# Patient Record
Sex: MALE | Race: WHITE | Employment: OTHER | URBAN - METROPOLITAN AREA
[De-identification: names, ages, dates, MRNs, and addresses within clinical notes are randomized per-mention and may not be internally consistent; named-entity substitution may affect disease eponyms.]

---

## 2017-01-23 ENCOUNTER — TRANSFERRED RECORDS (OUTPATIENT)
Dept: HEALTH INFORMATION MANAGEMENT | Facility: CLINIC | Age: 82
End: 2017-01-23

## 2017-03-28 ENCOUNTER — HOSPITAL ENCOUNTER (OUTPATIENT)
Dept: CARDIAC REHAB | Facility: CLINIC | Age: 82
End: 2017-03-28
Attending: SPECIALIST
Payer: COMMERCIAL

## 2017-03-28 VITALS — BODY MASS INDEX: 34.93 KG/M2 | WEIGHT: 244 LBS | HEIGHT: 70 IN

## 2017-03-28 PROCEDURE — G0424 PULMONARY REHAB W EXER: HCPCS | Performed by: REHABILITATION PRACTITIONER

## 2017-03-28 PROCEDURE — 40000244 ZZH STATISTIC VISIT PULM REHAB: Performed by: REHABILITATION PRACTITIONER

## 2017-03-28 ASSESSMENT — DUKE ACTIVITY SCORE INDEX (DASI)
DASI METS SCORE: 3.97
VO2_PEAK: 13.9

## 2017-03-28 ASSESSMENT — 6 MINUTE WALK TEST (6MWT)
FEMALE CALC: 314.22
MALE CALC: 430.11

## 2017-03-28 NOTE — PROGRESS NOTES
03/28/17 1400   Session   Session Initial Evaluation and Exercise Prescription   Certified through this date 04/26/17   Type Initial   General Information   Treatment Diagnosis COPD   Classification of COPD NA   Onset Date 01/01/89   [facility]   Current Signs and Symptoms SOB;Dizziness;Fatigue   Outpatient Pulmonary Rehab Start Date 03/28/17   Primary Physician Dr. Tejada   Pulmonolgist Dr. nicholson   Other Specialty Provider Etelvina Huggins   Medical History/Comorbidities   Medical History/Comorbidities COPD;Anxiety   Untoward Events/Exacerbations/Hospitalizations   Untoward Events/Exacerbations/Hospitalizations None   Sputum   Sputum Production Amount 0.5-1 tsp   Sputum Production Color White   Sputum Production Consistency Thick   Tobacco History   Tobacco Former smoker   Tobacco Habit Cigarettes   Years Smoked 40   Average Packs Per Day 1   Quit Date or Planned Quit Date 01/01/89   Interventions Planned None: Patient is in maintenance   Medications   Long-Acting Beta Agonist Prescribed, taking as prescribed   Short-Acting Beta Agonist Prescribed, taking as prescribed   Long-Acting Anticholinergic Not prescribed   Short-Acting Anticholinergic Not prescribed   Inhaled Corticosteroid Prescribed, taking as prescribed   Oral Corticosteroid Prescribed, taking as prescribed   Medications Reconciled By Patient;Medical record   Pain   Patient Currently in Pain Yes   Pain Location low back   Pain Rating 6/10   Pain Description Burning;Ache   Pain Description Comment Taking T3 every 4 hours for chronic pain   Falls Screen   Have you fallen two or more times in the past year? Yes   Have you fallen and had an injury in the past year? Yes   Referral initiated to physical therapy No   Comment Has attended PT in the past, falls may be related to low back pain and tremors   Living and Work Status   Living Arrangements condominium;spouse   Support System Live with an adult   Environmental  "Factors No concerns   ADL Limitations Cooking;Stair climbing   Initial Duke Activity Status Index (DASI) score. A measure of functional capacity. The goal is to have a pre-program raw score of 9.95 (~4 METs) or above 10   Initial DASI VO2 Peak (ml*kg-1*min-1) 13.9   Initial DASI MET Level 3.97   Return to Employment Retired   Physical Assessments   Incisions Not applicable   Edema +2 Mild   Left Lung Sounds wheezes   Right Lung Sounds wheezes   Pulmonary Function Test (PFTs)   PFT Results Information not available   Individualized Treatment Plan   Sessions Scheduled 10   Sessions Attended 1   Type Aerobic exercise;Resistance training   Oxygen Use   Supplemental Oxygen Needed No   Interventions Recommended NA   Exercise Prescription   Mode Sci-Fit;Arm Ergometer/UBE;Weights   Frequency 2 days/week   Duration/Time 15-30 min;Intermittent bouts   THR (85% of age predicted max heart rate)  117.3   Effort Rating (0-10) 4-7   Progression of Exercise Plan to increase intermiitent bouts with therapeutic rest break until patient is able to successfully complete 20-30 minutes of aerobic exercise, then plan to increase exercise workloads by up to 0.25 METs per week   Oxygen Titration with Exercise NA   Exercise Assessment   6 Minute Walk Predicted - Gender Selection (6 minute walk not completed due to orthopedic limitations)   6 Minute Walk Predicted (Female) 314.22   6 Minute Walk Predicted (Male) 430.11   Resting HR 77   Exercise HR 87   SpO2 92   Exercise SpO2 95   Current MET level 2.2   Exercise Tolerance poor   Normal Limits Discussed Yes   Current Symptoms at Home Dyspnea;Dizziness;Fatigue   Current Symptoms in Rehab Dyspnea;Fatigue   Limitations low back pain   Nutrition Management   Age 82   Height 1.778 m (5' 10\")   Weight 110.7 kg (244 lb)   BMI (Calculated) 35.08   Assessment Overweight   Goal weight 90.7 kg (200 lb)   Interventions Planned Attend group nutrition class;Educate on weight loss principles;Initiate plan " for weight loss/weight maintenance   Psychosocial   Initial Patient Health Questionnaire -9 (PHQ-9) for depression. To notify physician if pre-score >9. 6   Initial [facility] [place] Survey score. A quality of life measure. To re evaluate if total score is > 25. Also consider PHQ-9 and DASI to determine if patient should be referred back to physician. 28   Initial Shortness of Breath Questionnaire (SOBQ) score. The goal is to reduce the score pre to post program. 100   Stages of Change   Aerobic Exercise Contemplation   Physical Activity Contemplation   Recommended diet Contemplation   Stress Action   Smoking Cessation Maintenance   Oxygen Usage NA   Current Home Exercise   Type of Exercise None   Recommended Home Exercise Prescription   Type of Exercise Not recommended   30 Day Exercise Plan Will try to find non-weight bearing home exercise program for patient   Learning Assessment   Learner Patient   Primary Language English   Preferred Learning Style Listening;Reading;Pictures/Video;Demonstration   Barriers to Learning Cognitive  (memory)   Patient Education/Referrals   Education Recommended Nutrition;Panic and Anxiety;Anatomy and Disease Process;Medication Overview;Air Quality   Pulmonary Rehab Goals   Pulmonary Rehab Goals 1;2   Goal 1   Goal Patient will be able to use pursed lip breathing technique 60-80% of time during exercise   Target Date 04/28/17   Progress Towards Goal 3/28 Patient would like to ingrain PLB in his ADLs to decrease dyspnea overall; will work with patient to trigger use of PLB during pulmonary rehab sessions   Goal 2   Goal Patient will be able to perform ADLs, including sexual activity and visiting with friends with minimal dyspnea; estimated METs 2.5-3   Target Date 05/28/17   Progress Towards Goal 3/28 Patient will attend pulmonary rehab twice weekly for 10 sessions to build exercise tolerance and endurance.   Assessment   Assessment Uriah is a very pleasant 81 yo gentleman, known to  the pulmonary rehab staff after attending pulmonary rehab last year. At that time a request was made for additional sessions [facility] Approval of additional session has finally been recieved and patient is happily anticipating resuming pulmonary rehabilitation. Together we have been able to better define his personal goals.     The patient's history and clinical status including hemodynamics were evaluated.  The patient was assessed to be stable and appropriate to begin exercise. The patient's functional capacity and exercise prescription were assessed on the arm ergometer and Sci-fit stepper due to orthopedic limitations.  See results above. The patient was oriented to the program and equipment. Goals and objectives were discussed. Cardiopulmonary response was WNL. No symptoms, complaints or pain were reported. Good prognosis for reaching above goals. Skilled therapy is necessary in order to monitor cardiopulmonary response to exercise, to provide education and behavior change counseling needed to achieve patient's goals. All goals created in conjunction with patient.  Plan to progress to 25-35 minutes of aerobic exercise prior to discharge from pulmonary rehab, initiate muscle conditioning as appropriate and provide education and behavior change counseling.   Supervising MD: Dr. Pena   Time spent with patient: 65 minutes      I have reviewed and agree with this patient s individual treatment plan and exercise prescription for pulmonary rehabilitation.  Please see    individual treatment plan for details of progress and plan.

## 2017-03-30 ENCOUNTER — HOSPITAL ENCOUNTER (OUTPATIENT)
Dept: CARDIAC REHAB | Facility: CLINIC | Age: 82
End: 2017-03-30
Attending: SPECIALIST
Payer: COMMERCIAL

## 2017-03-30 VITALS — BODY MASS INDEX: 34.75 KG/M2 | WEIGHT: 242.2 LBS

## 2017-03-30 PROCEDURE — G0424 PULMONARY REHAB W EXER: HCPCS | Performed by: REHABILITATION PRACTITIONER

## 2017-03-30 PROCEDURE — 40000244 ZZH STATISTIC VISIT PULM REHAB: Performed by: REHABILITATION PRACTITIONER

## 2017-04-04 ENCOUNTER — HOSPITAL ENCOUNTER (OUTPATIENT)
Dept: CARDIAC REHAB | Facility: CLINIC | Age: 82
End: 2017-04-04
Attending: SPECIALIST
Payer: COMMERCIAL

## 2017-04-04 VITALS — WEIGHT: 245 LBS | BODY MASS INDEX: 35.15 KG/M2

## 2017-04-04 PROCEDURE — G0424 PULMONARY REHAB W EXER: HCPCS | Performed by: REHABILITATION PRACTITIONER

## 2017-04-04 PROCEDURE — 40000244 ZZH STATISTIC VISIT PULM REHAB: Performed by: REHABILITATION PRACTITIONER

## 2017-04-06 ENCOUNTER — HOSPITAL ENCOUNTER (OUTPATIENT)
Dept: CARDIAC REHAB | Facility: CLINIC | Age: 82
End: 2017-04-06
Attending: SPECIALIST
Payer: COMMERCIAL

## 2017-04-06 VITALS — BODY MASS INDEX: 35.15 KG/M2 | WEIGHT: 245 LBS

## 2017-04-06 PROCEDURE — G0424 PULMONARY REHAB W EXER: HCPCS | Performed by: REHABILITATION PRACTITIONER

## 2017-04-06 PROCEDURE — 40000244 ZZH STATISTIC VISIT PULM REHAB: Performed by: REHABILITATION PRACTITIONER

## 2017-04-13 ENCOUNTER — HOSPITAL ENCOUNTER (OUTPATIENT)
Dept: CARDIAC REHAB | Facility: CLINIC | Age: 82
End: 2017-04-13
Attending: SPECIALIST
Payer: COMMERCIAL

## 2017-04-13 VITALS — BODY MASS INDEX: 34.95 KG/M2 | WEIGHT: 243.6 LBS

## 2017-04-13 PROCEDURE — 40000244 ZZH STATISTIC VISIT PULM REHAB: Performed by: REHABILITATION PRACTITIONER

## 2017-04-13 PROCEDURE — G0424 PULMONARY REHAB W EXER: HCPCS | Performed by: REHABILITATION PRACTITIONER

## 2017-04-18 ENCOUNTER — HOSPITAL ENCOUNTER (OUTPATIENT)
Dept: CARDIAC REHAB | Facility: CLINIC | Age: 82
End: 2017-04-18
Attending: SPECIALIST
Payer: COMMERCIAL

## 2017-04-18 VITALS — WEIGHT: 243 LBS | BODY MASS INDEX: 34.87 KG/M2

## 2017-04-18 PROCEDURE — G0424 PULMONARY REHAB W EXER: HCPCS

## 2017-04-18 PROCEDURE — 40000244 ZZH STATISTIC VISIT PULM REHAB

## 2017-04-25 ENCOUNTER — HOSPITAL ENCOUNTER (OUTPATIENT)
Dept: CARDIAC REHAB | Facility: CLINIC | Age: 82
End: 2017-04-25
Attending: SPECIALIST
Payer: COMMERCIAL

## 2017-04-25 VITALS — WEIGHT: 243 LBS | HEIGHT: 70 IN | BODY MASS INDEX: 34.79 KG/M2

## 2017-04-25 PROCEDURE — 40000244 ZZH STATISTIC VISIT PULM REHAB: Performed by: REHABILITATION PRACTITIONER

## 2017-04-25 PROCEDURE — G0424 PULMONARY REHAB W EXER: HCPCS | Performed by: REHABILITATION PRACTITIONER

## 2017-04-25 ASSESSMENT — 6 MINUTE WALK TEST (6MWT)
MALE CALC: 430.91
FEMALE CALC: 315.26

## 2017-04-25 ASSESSMENT — DUKE ACTIVITY SCORE INDEX (DASI)
DASI METS SCORE: 3.97
VO2_PEAK: 13.9

## 2017-04-25 NOTE — PROGRESS NOTES
04/25/17 1400   Session   Session 30 Day Individualized Treatment Plan   Certified through this date 05/24/17   Type Reassessment   General Information   Treatment Diagnosis COPD   Classification of COPD NA   Onset Date 01/01/89   [facility]   Outpatient Pulmonary Rehab Start Date 03/28/17   Primary Physician Dr. Tejada   Pulmonolgist Dr. nicholson   Other Specialty Provider Etelvina Huggins   Medical History/Comorbidities   Medical History/Comorbidities COPD;Anxiety   Untoward Events/Exacerbations/Hospitalizations   Untoward Events/Exacerbations/Hospitalizations None   Sputum   Sputum Production Amount 0.5-1 tsp   Sputum Production Color White   Sputum Production Consistency Thick   Tobacco History   Tobacco Former smoker   Tobacco Habit Cigarettes   Years Smoked 40   Average Packs Per Day 1   Quit Date or Planned Quit Date 01/01/89   Interventions Planned None: Patient is in maintenance   Medications   Long-Acting Beta Agonist Prescribed, taking as prescribed   Short-Acting Beta Agonist Prescribed, taking as prescribed   Long-Acting Anticholinergic Not prescribed   Short-Acting Anticholinergic Not prescribed   Inhaled Corticosteroid Prescribed, taking as prescribed   Oral Corticosteroid Prescribed, taking as prescribed   Medications Reconciled By Patient;Medical record   Pain   Patient Currently in Pain Yes   Pain Location right shoulder   Pain Rating 5/10   Falls Screen   Have you fallen two or more times in the past year? Yes   Have you fallen and had an injury in the past year? Yes   Referral initiated to physical therapy No   Comment Has attended PT in the past, falls may be related to low back pain and tremors   Living and Work Status   Living Arrangements condominium;spouse   Support System Live with an adult   Environmental Factors No concerns   ADL Limitations Cooking;Stair climbing   Initial Duke Activity Status Index (DASI) score. A measure of functional capacity. The  "goal is to have a pre-program raw score of 9.95 (~4 METs) or above 10   Initial DASI VO2 Peak (ml*kg-1*min-1) 13.9   Initial DASI MET Level 3.97   Return to Employment Retired   Physical Assessments   Incisions Not applicable   Edema Not assessed   Left Lung Sounds not assessed   Right Lung Sounds not assessed   Pulmonary Function Test (PFTs)   PFT Results Information not available   Individualized Treatment Plan   Sessions Scheduled 10   Sessions Attended 7   Oxygen Use   Supplemental Oxygen Needed No   Interventions Recommended NA   Exercise Prescription   Mode Sci-Fit;Arm Ergometer/UBE;Weights   Frequency 2 days/week   Duration/Time 15-30 min;Intermittent bouts   THR (85% of age predicted max heart rate)  117.3   Effort Rating (0-10) 4-7   Progression of Exercise Plan to increase intermiitent bouts with therapeutic rest break until patient is able to successfully complete 20-30 minutes of aerobic exercise, then plan to increase exercise workloads by up to 0.25 METs per week   Oxygen Titration with Exercise NA   Exercise Assessment   6 Minute Walk Predicted - Gender Selection (6 minute walk not completed due to orthopedic limitations)   6 Minute Walk Predicted (Female) 315.26   6 Minute Walk Predicted (Male) 430.91   Resting HR 84   Exercise HR 93   SpO2 96   Exercise SpO2 93   Current MET level 2.3   Exercise Tolerance poor   Normal Limits Discussed Yes   Current Symptoms at Home Dyspnea;Dizziness;Fatigue   Current Symptoms in Rehab Dyspnea;Fatigue   Limitations low back pain   Nutrition Management   Age 82   Height 1.778 m (5' 10\")   Weight 110.2 kg (243 lb)   BMI (Calculated) 34.94   Assessment Overweight   Goal weight 90.7 kg (200 lb)   Interventions Planned Attend group nutrition class;Educate on weight loss principles;Initiate plan for weight loss/weight maintenance   Psychosocial   Initial Patient Health Questionnaire -9 (PHQ-9) for depression. To notify physician if pre-score >9. 6   Initial [facility] " Survey score. A quality of life measure. To re evaluate if total score is > 25. Also consider PHQ-9 and DASI to determine if patient should be referred back to physician. 28   Initial Shortness of Breath Questionnaire (SOBQ) score. The goal is to reduce the score pre to post program. 100   Stages of Change   Aerobic Exercise Contemplation   Physical Activity Contemplation   Recommended diet Contemplation   Stress Action   Smoking Cessation Maintenance   Oxygen Usage NA   Current Home Exercise   Type of Exercise None   Recommended Home Exercise Prescription   Type of Exercise Not recommended   30 Day Exercise Plan Will try to find non-weight bearing home exercise program for patient   Learning Assessment   Learner Patient   Primary Language English   Preferred Learning Style Listening;Reading;Pictures/Video;Demonstration   Barriers to Learning Cognitive  (memory)   Patient Education/Referrals   Education Recommended Nutrition;Panic and Anxiety;Anatomy and Disease Process;Medication Overview;Air Quality   Pulmonary Rehab Goals   Pulmonary Rehab Goals 1;2   Goal 1   Goal Patient will be able to use pursed lip breathing technique 60-80% of time during exercise   Target Date 04/28/17   Progress Towards Goal 3/28 Patient would like to ingrain PLB in his ADLs to decrease dyspnea overall; will work with patient to trigger use of PLB during pulmonary rehab sessions 4/25 Patient has not been using PLB consistantly with exercise, states he practices at home when he remembers   Goal 2   Goal Patient will be able to perform ADLs, including sexual activity and visiting with friends with minimal dyspnea; estimated METs 2.5-3   Target Date 05/28/17   Progress Towards Goal 3/28 Patient will attend pulmonary rehab twice weekly for 10 sessions to build exercise tolerance and endurance.4/25 Patient is able to visit with coffee club to socialize, would like to visit golf club but [place] is on the second level without elevator which  makes acess near impossible. Will try to increase leg strength for stair climbing and teach stair climbing technique with dyspnea   Assessment   Assessment Kruse (Bill) Has made slow progress in pulmonary rehabilitation at this time. he has been able to increase from 2, 5 minutes exercise bouts at a peak of 2.2 METs to 2 10-15 minute bouts at a peak of 2.3 METs. His othopedic pains and discomforts continue to limit exercise progression. As of the last two weeks, however, patient has been able to make a mice increase in overall exercise time. He was initially given 10 sessions of pulmonary rehabilitation, he would benefit from additional sessions however to continue to increase exercise workloads and use of breathing techniques to reach goals as stated just above. He will be seeing his primary physciain this week and request an additional 10 sessions of pulmonary rehabilitation at that time.     I have reviewed initial evaluation outcomes, and agree with exercise prescription for respiratory therapy for this patient

## 2017-04-27 ENCOUNTER — HOSPITAL ENCOUNTER (OUTPATIENT)
Dept: CARDIAC REHAB | Facility: CLINIC | Age: 82
End: 2017-04-27
Attending: SPECIALIST
Payer: COMMERCIAL

## 2017-04-27 VITALS — WEIGHT: 243.2 LBS | BODY MASS INDEX: 34.9 KG/M2

## 2017-04-27 PROCEDURE — 40000244 ZZH STATISTIC VISIT PULM REHAB: Performed by: REHABILITATION PRACTITIONER

## 2017-04-27 PROCEDURE — G0424 PULMONARY REHAB W EXER: HCPCS | Performed by: REHABILITATION PRACTITIONER

## 2017-05-01 ENCOUNTER — TRANSFERRED RECORDS (OUTPATIENT)
Dept: HEALTH INFORMATION MANAGEMENT | Facility: CLINIC | Age: 82
End: 2017-05-01

## 2017-05-02 ENCOUNTER — HOSPITAL ENCOUNTER (OUTPATIENT)
Dept: CARDIAC REHAB | Facility: CLINIC | Age: 82
End: 2017-05-02
Attending: SPECIALIST
Payer: MEDICARE

## 2017-05-02 VITALS — WEIGHT: 244.2 LBS | BODY MASS INDEX: 35.04 KG/M2

## 2017-05-02 PROCEDURE — 40000244 ZZH STATISTIC VISIT PULM REHAB: Performed by: REHABILITATION PRACTITIONER

## 2017-05-02 PROCEDURE — G0424 PULMONARY REHAB W EXER: HCPCS | Performed by: REHABILITATION PRACTITIONER

## 2017-05-09 ENCOUNTER — HOSPITAL ENCOUNTER (OUTPATIENT)
Dept: CARDIAC REHAB | Facility: CLINIC | Age: 82
End: 2017-05-09
Attending: SPECIALIST
Payer: MEDICARE

## 2017-05-09 VITALS — WEIGHT: 244 LBS | BODY MASS INDEX: 35.01 KG/M2

## 2017-05-09 PROCEDURE — G0424 PULMONARY REHAB W EXER: HCPCS | Performed by: REHABILITATION PRACTITIONER

## 2017-05-09 PROCEDURE — 40000244 ZZH STATISTIC VISIT PULM REHAB: Performed by: REHABILITATION PRACTITIONER

## 2017-06-13 ENCOUNTER — HOSPITAL ENCOUNTER (OUTPATIENT)
Dept: CARDIAC REHAB | Facility: CLINIC | Age: 82
End: 2017-06-13
Attending: SPECIALIST
Payer: COMMERCIAL

## 2017-06-13 VITALS — BODY MASS INDEX: 34.22 KG/M2 | HEIGHT: 70 IN | WEIGHT: 239 LBS

## 2017-06-13 PROCEDURE — 40000244 ZZH STATISTIC VISIT PULM REHAB: Performed by: REHABILITATION PRACTITIONER

## 2017-06-13 PROCEDURE — G0424 PULMONARY REHAB W EXER: HCPCS | Performed by: REHABILITATION PRACTITIONER

## 2017-06-13 ASSESSMENT — 6 MINUTE WALK TEST (6MWT)
FEMALE CALC: 319.42
MALE CALC: 434.11

## 2017-06-13 ASSESSMENT — DUKE ACTIVITY SCORE INDEX (DASI)
VO2_PEAK: 13.9
DASI METS SCORE: 3.97

## 2017-06-13 NOTE — PROGRESS NOTES
06/13/17 1300   Session   Session 60 Day Individualized Treatment Plan   Certified through this date 07/12/17   Type Reassessment   General Information   Treatment Diagnosis COPD   Classification of COPD NA   Onset Date 01/01/89   [facility]   Outpatient Pulmonary Rehab Start Date 03/28/17   Primary Physician Dr. Tejada   Pulmonolgist Dr. nicholson   Other Specialty Provider Etelvina Huggins   Medical History/Comorbidities   Medical History/Comorbidities COPD;Anxiety   Untoward Events/Exacerbations/Hospitalizations   Untoward Events/Exacerbations/Hospitalizations None   Sputum   Sputum Production Amount 0.5-1 tsp   Sputum Production Color White   Sputum Production Consistency Thick   Tobacco History   Tobacco Former smoker   Tobacco Habit Cigarettes   Years Smoked 40   Average Packs Per Day 1   Quit Date or Planned Quit Date 01/01/89   Interventions Planned None: Patient is in maintenance   Medications   Long-Acting Beta Agonist Prescribed, taking as prescribed   Short-Acting Beta Agonist Prescribed, taking as prescribed   Long-Acting Anticholinergic Not prescribed   Short-Acting Anticholinergic Not prescribed   Inhaled Corticosteroid Prescribed, taking as prescribed   Oral Corticosteroid Prescribed, taking as prescribed   Medications Reconciled By Patient;Medical record   Pain   Patient Currently in Pain Yes   Pain Location right shoulder   Pain Rating 6/10   Pain Description Burning;Ache   Pain Description Comment Taking T3 every 4 hours for chronic pain   Pain Comments Patient is considering surgery for rotator cuff repair   Falls Screen   Have you fallen two or more times in the past year? Yes   Have you fallen and had an injury in the past year? Yes   Referral initiated to physical therapy No   Comment Has attended PT in the past, falls may be related to low back pain and tremors   Living and Work Status   Living Arrangements condominium;spouse   Support System Live with an  "adult   Environmental Factors No concerns   ADL Limitations Cooking;Stair climbing   Initial Duke Activity Status Index (DASI) score. A measure of functional capacity. The goal is to have a pre-program raw score of 9.95 (~4 METs) or above 10   Initial DASI VO2 Peak (ml*kg-1*min-1) 13.9   Initial DASI MET Level 3.97   Return to Employment Retired   Physical Assessments   Incisions Not applicable   Edema Not assessed   Left Lung Sounds not assessed   Right Lung Sounds not assessed   Pulmonary Function Test (PFTs)   PFT Results Information not available   Individualized Treatment Plan   Sessions Scheduled 22   Sessions Attended 11   Type Aerobic exercise;Resistance training   Oxygen Use   Supplemental Oxygen Needed No   Interventions Recommended NA   Exercise Prescription   Mode Sci-Fit;Arm Ergometer/UBE;Weights   Frequency 2 days/week   Duration/Time 15-30 min;Intermittent bouts   THR (85% of age predicted max heart rate)  117.3   Effort Rating (0-10) 4-7   Progression of Exercise Plan to increase intermiitent bouts with therapeutic rest break until patient is able to successfully complete 20-30 minutes of aerobic exercise, then plan to increase exercise workloads by up to 0.25 METs per week   Oxygen Titration with Exercise NA   Exercise Assessment   6 Minute Walk Predicted - Gender Selection (6 minute walk not completed due to orthopedic limitations)   6 Minute Walk Predicted (Female) 319.42   6 Minute Walk Predicted (Male) 434.11   Resting HR 87   Exercise HR 92   SpO2 95   Exercise SpO2 94   Current MET level 2.4   Exercise Tolerance poor   Normal Limits Discussed Yes   Current Symptoms at Home Dyspnea;Dizziness;Fatigue   Current Symptoms in Rehab Dyspnea;Fatigue   Limitations low back pain   Nutrition Management   Age 82   Height 1.778 m (5' 10\")   Weight 108.4 kg (239 lb)   BMI (Calculated) 34.36   Assessment Overweight   Goal weight 90.7 kg (200 lb)   Interventions Planned Attend group nutrition class;Educate on " weight loss principles;Initiate plan for weight loss/weight maintenance   Psychosocial   Initial Patient Health Questionnaire -9 (PHQ-9) for depression. To notify physician if pre-score >9. 6   Initial [facility] [place] Survey score. A quality of life measure. To re evaluate if total score is > 25. Also consider PHQ-9 and DASI to determine if patient should be referred back to physician. 28   Initial Shortness of Breath Questionnaire (SOBQ) score. The goal is to reduce the score pre to post program. 100   Stages of Change   Aerobic Exercise Contemplation   Physical Activity Contemplation   Recommended diet Contemplation   Stress Action   Smoking Cessation Maintenance   Oxygen Usage NA   Current Home Exercise   Type of Exercise None   Recommended Home Exercise Prescription   Type of Exercise Not recommended   30 Day Exercise Plan Will try to find non-weight bearing home exercise program for patient   Learning Assessment   Learner Patient   Primary Language English   Preferred Learning Style Listening;Reading;Pictures/Video;Demonstration   Barriers to Learning Cognitive  (memory)   Patient Education/Referrals   Education Recommended Nutrition;Panic and Anxiety;Anatomy and Disease Process;Medication Overview;Air Quality   Pulmonary Rehab Goals   Pulmonary Rehab Goals 1;2   Goal 1   Goal Patient will be able to use pursed lip breathing technique 60-80% of time during exercise   Target Date 04/28/17   Progress Towards Goal 3/28 Patient would like to ingrain PLB in his ADLs to decrease dyspnea overall; will work with patient to trigger use of PLB during pulmonary rehab sessions 4/25 Patient has not been using PLB consistantly with exercise, states he practices at home when he remembers 6/13 Patient still requiring varbal cues to use PLB during exercise   Goal 2   Goal Patient will be able to perform ADLs, including sexual activity and visiting with friends with minimal dyspnea; estimated METs 2.5-3   Target Date 05/28/17    Progress Towards Goal 3/28 Patient will attend pulmonary rehab twice weekly for 10 sessions to build exercise tolerance and endurance.4/25 Patient is able to visit with coffee club to socialize, would like to visit golf club but [employer] is on the second level without elevator which makes acess near impossible. Will try to increase leg strength for stair climbing and teach stair climbing technique with dyspnea 6/13 Increasing activity tolerance very slowly due to break in rehab sessions noted below   Assessment   Assessment Kruse (Bill) has made only slight progress in his exercise capacity since his last ITP update. His largest barrier to increases in his exercise tolerance/endurance and gork towards his goals was the large break he took from rehab sessions because we were awating approval for additional sessions through [facility]. 40 days passed between initial request and today. Pricila returns today and is noticably more unstable with ambulation from scooter to exercise equipment but tolerated exercise session well with only a slight decrease in his exercise time on arm ergometer, mostly due to rotator cuff discomfort. Patient used mainly left arm on arm ergometer today due to this. He reports today he continues to have the goal of being less dyspnic with activity. Will continue to work closely with patient to incorporate breathing techniques during activity as well as increasing exercise tolerance and endurance to ease work while at home.      I have reviewed and agree with this patient s individual treatment plan and exercise prescription for respiratory therapy.  Please see    individual treatment plan for details of progress and plan.

## 2017-06-15 ENCOUNTER — HOSPITAL ENCOUNTER (OUTPATIENT)
Dept: CARDIAC REHAB | Facility: CLINIC | Age: 82
End: 2017-06-15
Attending: SPECIALIST
Payer: COMMERCIAL

## 2017-06-15 VITALS — BODY MASS INDEX: 34.29 KG/M2 | WEIGHT: 239 LBS

## 2017-06-15 PROCEDURE — 40000244 ZZH STATISTIC VISIT PULM REHAB: Performed by: REHABILITATION PRACTITIONER

## 2017-06-15 PROCEDURE — G0424 PULMONARY REHAB W EXER: HCPCS | Performed by: REHABILITATION PRACTITIONER

## 2017-06-20 ENCOUNTER — HOSPITAL ENCOUNTER (OUTPATIENT)
Dept: CARDIAC REHAB | Facility: CLINIC | Age: 82
End: 2017-06-20
Attending: SPECIALIST
Payer: COMMERCIAL

## 2017-06-20 VITALS — BODY MASS INDEX: 34.15 KG/M2 | WEIGHT: 238 LBS

## 2017-06-20 PROCEDURE — 40000244 ZZH STATISTIC VISIT PULM REHAB: Performed by: REHABILITATION PRACTITIONER

## 2017-06-20 PROCEDURE — G0424 PULMONARY REHAB W EXER: HCPCS | Performed by: REHABILITATION PRACTITIONER

## 2017-06-22 ENCOUNTER — HOSPITAL ENCOUNTER (OUTPATIENT)
Dept: CARDIAC REHAB | Facility: CLINIC | Age: 82
End: 2017-06-22
Attending: SPECIALIST
Payer: COMMERCIAL

## 2017-06-22 VITALS — WEIGHT: 240 LBS | BODY MASS INDEX: 34.44 KG/M2

## 2017-06-22 PROCEDURE — G0424 PULMONARY REHAB W EXER: HCPCS | Performed by: REHABILITATION PRACTITIONER

## 2017-06-22 PROCEDURE — 40000244 ZZH STATISTIC VISIT PULM REHAB: Performed by: REHABILITATION PRACTITIONER

## 2017-07-06 ENCOUNTER — HOSPITAL ENCOUNTER (OUTPATIENT)
Dept: CARDIAC REHAB | Facility: CLINIC | Age: 82
End: 2017-07-06
Attending: SPECIALIST
Payer: COMMERCIAL

## 2017-07-06 VITALS — BODY MASS INDEX: 33.89 KG/M2 | WEIGHT: 236.2 LBS

## 2017-07-06 PROCEDURE — 40000244 ZZH STATISTIC VISIT PULM REHAB: Performed by: REHABILITATION PRACTITIONER

## 2017-07-06 PROCEDURE — G0424 PULMONARY REHAB W EXER: HCPCS | Performed by: REHABILITATION PRACTITIONER

## 2017-07-11 ENCOUNTER — HOSPITAL ENCOUNTER (OUTPATIENT)
Dept: CARDIAC REHAB | Facility: CLINIC | Age: 82
End: 2017-07-11
Attending: SPECIALIST
Payer: COMMERCIAL

## 2017-07-11 VITALS — BODY MASS INDEX: 33.96 KG/M2 | WEIGHT: 237.2 LBS | HEIGHT: 70 IN

## 2017-07-11 PROCEDURE — 40000244 ZZH STATISTIC VISIT PULM REHAB: Performed by: REHABILITATION PRACTITIONER

## 2017-07-11 PROCEDURE — G0424 PULMONARY REHAB W EXER: HCPCS | Performed by: REHABILITATION PRACTITIONER

## 2017-07-11 ASSESSMENT — 6 MINUTE WALK TEST (6MWT)
MALE CALC: 435.55
FEMALE CALC: 321.29

## 2017-07-11 ASSESSMENT — DUKE ACTIVITY SCORE INDEX (DASI)
VO2_PEAK: 13.9
DASI METS SCORE: 3.97

## 2017-07-11 NOTE — PROGRESS NOTES
07/11/17 1500   Session   Session 90 Day Individualized Treatment Plan   Certified through this date 08/09/17   Type Reassessment   General Information   Treatment Diagnosis COPD   Classification of COPD NA   Onset Date 01/01/89   [facility]  [facility]   Outpatient Pulmonary Rehab Start Date 03/28/17   Primary Physician Dr. Tejada   Pulmonolgist Dr. nicholson   Other Specialty Provider Etelvina Huggins   Medical History/Comorbidities   Medical History/Comorbidities COPD;Anxiety   Untoward Events/Exacerbations/Hospitalizations   Untoward Events/Exacerbations/Hospitalizations None   Sputum   Sputum Production Amount 0.5-1 tsp   Sputum Production Color White   Sputum Production Consistency Thick   Tobacco History   Tobacco Former smoker   Tobacco Habit Cigarettes   Years Smoked 40   Average Packs Per Day 1   Quit Date or Planned Quit Date 01/01/89   Interventions Planned None: Patient is in maintenance   Medications   Long-Acting Beta Agonist Prescribed, taking as prescribed   Short-Acting Beta Agonist Prescribed, taking as prescribed   Long-Acting Anticholinergic Not prescribed   Short-Acting Anticholinergic Not prescribed   Inhaled Corticosteroid Prescribed, taking as prescribed   Oral Corticosteroid Prescribed, taking as prescribed   Medications Reconciled By Patient;Medical record   Pain   Patient Currently in Pain Yes   Pain Location right hip   Pain Rating 9-10   Pain Description Comment Taking T3 every 4 hours for chronic pain   Falls Screen   Have you fallen two or more times in the past year? Yes   Have you fallen and had an injury in the past year? Yes   Referral initiated to physical therapy No   Comment Has attended PT in the past, falls may be related to low back pain and tremors   Living and Work Status   Living Arrangements condominium;spouse   Support System Live with an adult   Environmental Factors No concerns   ADL Limitations Cooking;Stair climbing   Initial [facility] Activity  "Status Index (DASI) score. A measure of functional capacity. The goal is to have a pre-program raw score of 9.95 (~4 METs) or above 10   Initial DASI VO2 Peak (ml*kg-1*min-1) 13.9   Initial DASI MET Level 3.97   Return to Employment Retired   Physical Assessments   Incisions Not applicable   Edema Not assessed   Left Lung Sounds not assessed   Right Lung Sounds not assessed   Pulmonary Function Test (PFTs)   PFT Results Information not available   Individualized Treatment Plan   Sessions Scheduled 22   Sessions Attended 16   Oxygen Use   Supplemental Oxygen Needed No   Interventions Recommended NA   Exercise Prescription   Mode Sci-Fit;Arm Ergometer/UBE;Weights   Frequency 2 days/week   Duration/Time 15-30 min;Intermittent bouts   THR (85% of age predicted max heart rate)  117.3   Effort Rating (0-10) 4-7   Progression of Exercise Plan to increase intermiitent bouts with therapeutic rest break until patient is able to successfully complete 20-30 minutes of aerobic exercise, then plan to increase exercise workloads by up to 0.25 METs per week   Oxygen Titration with Exercise NA   Exercise Assessment   6 Minute Walk Predicted - Gender Selection (6 minute walk not completed due to orthopedic limitations)   6 Minute Walk Predicted (Female) 321.29   6 Minute Walk Predicted (Male) 435.55   Resting HR 97   Exercise    SpO2 93   Exercise SpO2 94   Current MET level 2.4   Exercise Tolerance poor   Normal Limits Discussed Yes   Current Symptoms at Home Dyspnea;Dizziness;Fatigue   Current Symptoms in Rehab Dyspnea;Fatigue   Limitations low back pain   Nutrition Management   Age 82   Height 1.778 m (5' 10\")   Weight 107.6 kg (237 lb 3.2 oz)   BMI (Calculated) 34.11   Assessment Overweight   Goal weight 90.7 kg (200 lb)   Interventions Planned Attend group nutrition class;Educate on weight loss principles;Initiate plan for weight loss/weight maintenance   Psychosocial   Initial Patient Health Questionnaire -9 (PHQ-9) for " depression. To notify physician if pre-score >9. 6   Initial [facility] COOP Survey score. A quality of life measure. To re evaluate if total score is > 25. Also consider PHQ-9 and DASI to determine if patient should be referred back to physician. 28   Initial Shortness of Breath Questionnaire (SOBQ) score. The goal is to reduce the score pre to post program. 100   Stages of Change   Aerobic Exercise Contemplation   Physical Activity Contemplation   Recommended diet Contemplation   Stress Action   Smoking Cessation Maintenance   Oxygen Usage NA   Current Home Exercise   Type of Exercise None   Recommended Home Exercise Prescription   Type of Exercise Not recommended   30 Day Exercise Plan Will try to find non-weight bearing home exercise program for patient   Learning Assessment   Learner Patient   Primary Language English   Preferred Learning Style Listening;Reading;Pictures/Video;Demonstration   Barriers to Learning Cognitive  (memory)   Patient Education/Referrals   Education Recommended Nutrition;Panic and Anxiety;Anatomy and Disease Process;Medication Overview;Air Quality   Pulmonary Rehab Goals   Pulmonary Rehab Goals 1;2   Goal 1   Goal Patient will be able to use pursed lip breathing technique 60-80% of time during exercise   Target Date 04/28/17   Date Met 07/11/17   Progress Towards Goal 7/11 Patient is consistantly using pursed lip breathing with exercise   Goal 2   Goal Patient will be able to perform ADLs, including sexual activity and visiting with friends with minimal dyspnea; estimated METs 2.5-3   Target Date 05/28/17   Progress Towards Goal 3/28 Patient will attend pulmonary rehab twice weekly for 10 sessions to build exercise tolerance and endurance.4/25 Patient is able to visit with coffee club to socialize, would like to visit golf club but clubhouse is on the second level without elevator which makes acess near impossible. Will try to increase leg strength for stair climbing and teach stair  climbing technique with dyspnea 6/13 Increasing activity tolerance very slowly due to break in rehab sessions noted below 7/11 Endurance has been building nicely over the past month, short break in rehab sessions due to holiday and patient traveling out of town.   Assessment   Assessment Uriah Altamirano' has been able to increase his exercise endurance by 8 minutes over the past month. He has 6 session remaining and we anticipate discharge within the next month, we will discuss transition to the [facility] program for continued exercise training as well as working towards any goals not achieved prior to discharge. Patient will continue to benefit from skilled services to increase exercise workloads progressively towards goal as listed above.      I have reviewed and agree with this patient s individual treatment plan and exercise prescription for pulmonary rehabilitation.  Please see    individual treatment plan for details of progress and plan.

## 2017-07-13 ENCOUNTER — HOSPITAL ENCOUNTER (OUTPATIENT)
Dept: CARDIAC REHAB | Facility: CLINIC | Age: 82
End: 2017-07-13
Attending: SPECIALIST
Payer: COMMERCIAL

## 2017-07-13 VITALS — BODY MASS INDEX: 34.32 KG/M2 | WEIGHT: 239.2 LBS

## 2017-07-13 PROCEDURE — G0424 PULMONARY REHAB W EXER: HCPCS | Performed by: REHABILITATION PRACTITIONER

## 2017-07-13 PROCEDURE — 40000244 ZZH STATISTIC VISIT PULM REHAB: Performed by: REHABILITATION PRACTITIONER

## 2017-07-18 ENCOUNTER — HOSPITAL ENCOUNTER (OUTPATIENT)
Dept: CARDIAC REHAB | Facility: CLINIC | Age: 82
End: 2017-07-18
Attending: SPECIALIST
Payer: COMMERCIAL

## 2017-07-18 VITALS — BODY MASS INDEX: 34.32 KG/M2 | WEIGHT: 239.2 LBS

## 2017-07-18 PROCEDURE — G0424 PULMONARY REHAB W EXER: HCPCS

## 2017-07-18 PROCEDURE — 40000244 ZZH STATISTIC VISIT PULM REHAB

## 2017-07-20 ENCOUNTER — HOSPITAL ENCOUNTER (OUTPATIENT)
Dept: CARDIAC REHAB | Facility: CLINIC | Age: 82
End: 2017-07-20
Attending: SPECIALIST
Payer: COMMERCIAL

## 2017-07-20 VITALS — BODY MASS INDEX: 34.01 KG/M2 | WEIGHT: 237 LBS

## 2017-07-20 PROCEDURE — G0424 PULMONARY REHAB W EXER: HCPCS

## 2017-07-20 PROCEDURE — 40000244 ZZH STATISTIC VISIT PULM REHAB

## 2017-07-25 ENCOUNTER — HOSPITAL ENCOUNTER (OUTPATIENT)
Dept: CARDIAC REHAB | Facility: CLINIC | Age: 82
End: 2017-07-25
Attending: SPECIALIST
Payer: COMMERCIAL

## 2017-07-25 VITALS — BODY MASS INDEX: 33.72 KG/M2 | WEIGHT: 235 LBS

## 2017-07-25 PROCEDURE — 40000244 ZZH STATISTIC VISIT PULM REHAB

## 2017-07-25 PROCEDURE — G0424 PULMONARY REHAB W EXER: HCPCS

## 2017-07-27 ENCOUNTER — HOSPITAL ENCOUNTER (OUTPATIENT)
Dept: CARDIAC REHAB | Facility: CLINIC | Age: 82
End: 2017-07-27
Attending: SPECIALIST
Payer: COMMERCIAL

## 2017-07-27 VITALS — WEIGHT: 243 LBS | BODY MASS INDEX: 34.87 KG/M2

## 2017-07-27 PROCEDURE — 40000244 ZZH STATISTIC VISIT PULM REHAB: Performed by: REHABILITATION PRACTITIONER

## 2017-07-27 PROCEDURE — G0424 PULMONARY REHAB W EXER: HCPCS | Performed by: REHABILITATION PRACTITIONER

## 2017-08-01 ENCOUNTER — HOSPITAL ENCOUNTER (OUTPATIENT)
Dept: CARDIAC REHAB | Facility: CLINIC | Age: 82
End: 2017-08-01
Attending: SPECIALIST
Payer: COMMERCIAL

## 2017-08-01 VITALS — WEIGHT: 241 LBS | BODY MASS INDEX: 34.5 KG/M2 | HEIGHT: 70 IN

## 2017-08-01 PROCEDURE — 40000576 ZZH STATISTIC CARDIO PULMONARY REHAB VISIT #2

## 2017-08-01 PROCEDURE — G0424 PULMONARY REHAB W EXER: HCPCS

## 2017-08-01 ASSESSMENT — 6 MINUTE WALK TEST (6MWT)
MALE CALC: 427.49
FEMALE CALC: 311.56

## 2017-08-01 ASSESSMENT — DUKE ACTIVITY SCORE INDEX (DASI)
VO2_PEAK: 13.9
DASI METS SCORE: 3.97

## 2017-08-01 NOTE — PROGRESS NOTES
Uriahcali Calloway  83 year old   08/01/17 1100   Session   Session Discharge Note   Type Reassessment   General Information   Treatment Diagnosis COPD   Classification of COPD NA   Onset Date 01/01/89   [facility] [place]  [place]   Outpatient Pulmonary Rehab Start Date 03/28/17   Primary Physician Dr. Tejada   Pulmonolgist Dr. nicholson   Other Specialty Provider Etelvina Huggins   Medical History/Comorbidities   Medical History/Comorbidities COPD;Anxiety   Untoward Events/Exacerbations/Hospitalizations   Untoward Events/Exacerbations/Hospitalizations None   Sputum   Sputum Production Amount 0.5-1 tsp   Sputum Production Color White   Sputum Production Consistency Thick   Tobacco History   Tobacco Former smoker   Tobacco Habit Cigarettes   Years Smoked 40   Average Packs Per Day 1   Quit Date or Planned Quit Date 01/01/89   Interventions Planned None: Patient is in maintenance   Medications   Long-Acting Beta Agonist Prescribed, taking as prescribed   Short-Acting Beta Agonist Prescribed, taking as prescribed   Long-Acting Anticholinergic Not prescribed   Short-Acting Anticholinergic Not prescribed   Inhaled Corticosteroid Prescribed, taking as prescribed   Oral Corticosteroid Prescribed, taking as prescribed   Medications Reconciled By Patient;Medical record   Pain   Patient Currently in Pain Yes   Pain Location right hip   Pain Rating 8   Pain Description Comment Taking T3 every 4 hours for chronic pain   Falls Screen   Have you fallen two or more times in the past year? Yes   Have you fallen and had an injury in the past year? Yes   Referral initiated to physical therapy No   Comment Has attended PT in the past, falls may be related to low back pain and tremors   Living and Work Status   Living Arrangements condominium;spouse   Support System Live with an adult   Environmental Factors No concerns   ADL Limitations Cooking;Stair climbing   Initial [facility] Activity Status Index (DASI) score. A measure  "of functional capacity. The goal is to have a pre-program raw score of 9.95 (~4 METs) or above 10   Initial DASI VO2 Peak (ml*kg-1*min-1) 13.9   Initial DASI MET Level 3.97   Return to Employment Retired   Physical Assessments   Incisions Not applicable   Edema Not assessed   Left Lung Sounds not assessed   Right Lung Sounds not assessed   Pulmonary Function Test (PFTs)   PFT Results Information not available   Individualized Treatment Plan   Sessions Scheduled 22   Sessions Attended 22   Type Aerobic exercise;Flexibility training   Oxygen Use   Supplemental Oxygen Needed No   Interventions Recommended NA   Exercise Prescription   Mode Sci-Fit;Arm Ergometer/UBE;Weights   Frequency 2 days/week   Duration/Time 15-30 min;Intermittent bouts   THR (85% of age predicted max heart rate)  116.45   Effort Rating (0-10) 4-7   Progression of Exercise Plan to increase intermiitent bouts with therapeutic rest break until patient is able to successfully complete 20-30 minutes of aerobic exercise, then plan to increase exercise workloads by up to 0.25 METs per week   Oxygen Titration with Exercise NA   Exercise Assessment   6 Minute Walk Predicted - Gender Selection (6 minute walk not completed due to orthopedic limitations)   6 Minute Walk Predicted (Female) 311.56   6 Minute Walk Predicted (Male) 427.49   Resting HR 90   Exercise HR 98   Resting /70   Exercise /68   SpO2 94   Exercise SpO2 94   Current MET level 2.4   Exercise Tolerance poor   Normal Limits Discussed Yes   Current Symptoms at Home Dyspnea;Dizziness;Fatigue   Current Symptoms in Rehab Dyspnea;Fatigue   Limitations low back pain   Nutrition Management   Age 83   Height 1.778 m (5' 10\")   Weight 109.3 kg (241 lb)   BMI (Calculated) 34.65   Assessment Overweight   Goal weight 90.7 kg (200 lb)   Interventions Planned Attend group nutrition class;Educate on weight loss principles;Initiate plan for weight loss/weight maintenance   Interventions Completed " Educated on weight loss principles   Psychosocial   Initial Patient Health Questionnaire -9 (PHQ-9) for depression. To notify physician if pre-score >9. 6   Initial [facility] COOP Survey score. A quality of life measure. To re evaluate if total score is > 25. Also consider PHQ-9 and DASI to determine if patient should be referred back to physician. 28   Initial Shortness of Breath Questionnaire (SOBQ) score. The goal is to reduce the score pre to post program. 100   Discharge PHQ-9 for depression 5   Discharge [facility] COOP Survey Score 26   Discharge SOBQ Score 81   Psychosocial Comments Patient stated he has no problems with depression   Stages of Change   Aerobic Exercise Preparation   Physical Activity Preparation   Recommended diet Contemplation   Stress Action   Smoking Cessation Maintenance   Oxygen Usage NA   Current Home Exercise   Type of Exercise Treadmill   Frequency (days per week) 3   Duration (minutes per session) 5-10min   Recommended Home Exercise Prescription   Type of Exercise Treadmill;Calisthenics   Frequency (Days per week) 3   Duration (minutes per session) 15-30 min;Intermittent   Effort Rating Recommended (0-10) Scale  4-6/10   Recommended Exercise Heart Rate Range    30 Day Exercise Plan Patient reports he is able to walk 5-10 min bouts on home tdm- hanging on to railings.     Learning Assessment   Learner Patient   Primary Language English   Preferred Learning Style Listening;Reading;Pictures/Video;Demonstration   Barriers to Learning Cognitive  (memory)   Patient Education/Referrals   Education Recommended Nutrition;Panic and Anxiety;Anatomy and Disease Process;Medication Overview;Air Quality   Education Attended Activities of Daily Living;Advance Directives;Exercise Principles;Community Resources/Exacerbation Management   Follow-up/On-going Support   Provider follow-up needed on the following Other (see comments)   Comments Will be having Rt shoulder surgery in August.   Pulmonary  Rehab Goals   Pulmonary Rehab Goals 1;2   Goal 1   Goal Patient will be able to use pursed lip breathing technique 60-80% of time during exercise   Target Date 04/28/17   Date Met 07/11/17   Progress Towards Goal 7/11 Patient is consistantly using pursed lip breathing with exercise   Goal 2   Goal Patient will be able to perform ADLs, including sexual activity and visiting with friends with minimal dyspnea; estimated METs 2.5-3   Target Date 05/28/17   Progress Towards Goal 3/28 Patient will attend pulmonary rehab twice weekly for 10 sessions to build exercise tolerance and endurance.4/25 Patient is able to visit with coffee club to socialize, would like to visit [employer] club but clubhouse is on the second level without elevator which makes acess near impossible. Will try to increase leg strength for stair climbing and teach stair climbing technique with dyspnea 6/13 Increasing activity tolerance very slowly due to break in rehab sessions noted below 7/11 Endurance has been building nicely over the past month, short break in rehab sessions due to holiday and patient traveling out of town. 8/1: Patient reports that he is able to go out and visit with friends, walk on home tdm 5-10 min bouts holding on, needs help with ADLs at times, but is now able to do stairs slowly.   Assessment   Assessment Bill Discharged from pulmonary rehab and has increased his exercise time to 22 min at 2.4 METS, his survey scores have improved, showing improvement in functional capacity with less dyspnea.  He is limited by back/hip pain and uses a scooter for mobility.  He is now able to walk up/.down stairs slowly and uses his home Treadmill 5-10 min bouts.  Humidity limits his breathing also.  He plans to begin exercising at the [facility] program following his shoulder surgery in August.    Pt made moderate gains in exercise tolerance. Initially patient tolerated 2x 5 minutes bouts at 2.0METs, now tolerating 22 minutes at 2.4 METs. The PT was  given instructions on frequency, intensity, and duration for continued exercise as well as muscle conditioning and stretching exercises.  Your PT plans to continue to exercise at home 3 days/week until he continues his exercise in the [facility] program.     Time: 30 min group exercise, 30 min 1:1 home program given, surveys completed and reviewed with patient, goals discussed.  I have reviewed initial evaluation outcomes, and agree with exercise prescription for respiratory therapy for this patient

## 2017-08-14 ENCOUNTER — ALLIED HEALTH/NURSE VISIT (OUTPATIENT)
Dept: CARDIAC REHAB | Facility: CLINIC | Age: 82
End: 2017-08-14

## 2017-08-24 ENCOUNTER — TRANSFERRED RECORDS (OUTPATIENT)
Dept: HEALTH INFORMATION MANAGEMENT | Facility: CLINIC | Age: 82
End: 2017-08-24

## 2018-02-26 ENCOUNTER — TRANSFERRED RECORDS (OUTPATIENT)
Dept: HEALTH INFORMATION MANAGEMENT | Facility: CLINIC | Age: 83
End: 2018-02-26

## 2018-05-21 ENCOUNTER — TRANSFERRED RECORDS (OUTPATIENT)
Dept: HEALTH INFORMATION MANAGEMENT | Facility: CLINIC | Age: 83
End: 2018-05-21

## 2018-08-27 ENCOUNTER — TRANSFERRED RECORDS (OUTPATIENT)
Dept: HEALTH INFORMATION MANAGEMENT | Facility: CLINIC | Age: 83
End: 2018-08-27

## 2018-12-06 ENCOUNTER — NURSING HOME VISIT (OUTPATIENT)
Dept: GERIATRICS | Facility: CLINIC | Age: 83
End: 2018-12-06
Payer: COMMERCIAL

## 2018-12-06 VITALS
WEIGHT: 228.7 LBS | BODY MASS INDEX: 32.74 KG/M2 | HEART RATE: 76 BPM | DIASTOLIC BLOOD PRESSURE: 59 MMHG | HEIGHT: 70 IN | RESPIRATION RATE: 16 BRPM | OXYGEN SATURATION: 91 % | SYSTOLIC BLOOD PRESSURE: 115 MMHG | TEMPERATURE: 98 F

## 2018-12-06 DIAGNOSIS — Z96.642 HISTORY OF TOTAL LEFT HIP ARTHROPLASTY: ICD-10-CM

## 2018-12-06 DIAGNOSIS — I50.9 CHRONIC CONGESTIVE HEART FAILURE, UNSPECIFIED HEART FAILURE TYPE (H): ICD-10-CM

## 2018-12-06 DIAGNOSIS — N40.0 BENIGN PROSTATIC HYPERPLASIA WITHOUT LOWER URINARY TRACT SYMPTOMS: ICD-10-CM

## 2018-12-06 DIAGNOSIS — I10 ESSENTIAL HYPERTENSION: ICD-10-CM

## 2018-12-06 DIAGNOSIS — R12 CHRONIC HEARTBURN: ICD-10-CM

## 2018-12-06 DIAGNOSIS — J44.9 CHRONIC OBSTRUCTIVE PULMONARY DISEASE, UNSPECIFIED COPD TYPE (H): ICD-10-CM

## 2018-12-06 DIAGNOSIS — I25.2 OLD MYOCARDIAL INFARCTION: ICD-10-CM

## 2018-12-06 DIAGNOSIS — E11.9 TYPE 2 DIABETES MELLITUS WITHOUT COMPLICATION, WITHOUT LONG-TERM CURRENT USE OF INSULIN (H): ICD-10-CM

## 2018-12-06 DIAGNOSIS — M16.12 ARTHRITIS OF LEFT HIP: Primary | ICD-10-CM

## 2018-12-06 PROCEDURE — 99310 SBSQ NF CARE HIGH MDM 45: CPT | Performed by: NURSE PRACTITIONER

## 2018-12-06 RX ORDER — METFORMIN HCL 500 MG
500 TABLET, EXTENDED RELEASE 24 HR ORAL
COMMUNITY

## 2018-12-06 RX ORDER — LEVALBUTEROL INHALATION SOLUTION 0.63 MG/3ML
1 SOLUTION RESPIRATORY (INHALATION) 2 TIMES DAILY PRN
COMMUNITY

## 2018-12-06 RX ORDER — GUAIFENESIN 200 MG/1
400 TABLET ORAL 2 TIMES DAILY PRN
COMMUNITY

## 2018-12-06 RX ORDER — AMOXICILLIN 250 MG
2 CAPSULE ORAL 2 TIMES DAILY
COMMUNITY

## 2018-12-06 RX ORDER — BISACODYL 10 MG
10 SUPPOSITORY, RECTAL RECTAL DAILY PRN
COMMUNITY
End: 2018-12-06

## 2018-12-06 RX ORDER — BISACODYL 5 MG/1
5 TABLET, DELAYED RELEASE ORAL DAILY PRN
COMMUNITY
End: 2018-12-06

## 2018-12-06 RX ORDER — BUDESONIDE AND FORMOTEROL FUMARATE DIHYDRATE 160; 4.5 UG/1; UG/1
2 AEROSOL RESPIRATORY (INHALATION) 2 TIMES DAILY
COMMUNITY
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
## 2018-12-10 ENCOUNTER — HOSPITAL LABORATORY (OUTPATIENT)
Dept: NURSING HOME | Facility: OTHER | Age: 83
End: 2018-12-10

## 2018-12-10 ENCOUNTER — NURSING HOME VISIT (OUTPATIENT)
Dept: GERIATRICS | Facility: CLINIC | Age: 83
End: 2018-12-10
Payer: COMMERCIAL

## 2018-12-10 VITALS
BODY MASS INDEX: 31.92 KG/M2 | HEIGHT: 70 IN | RESPIRATION RATE: 20 BRPM | WEIGHT: 223 LBS | SYSTOLIC BLOOD PRESSURE: 126 MMHG | HEART RATE: 75 BPM | TEMPERATURE: 97.9 F | OXYGEN SATURATION: 91 % | DIASTOLIC BLOOD PRESSURE: 74 MMHG

## 2018-12-10 DIAGNOSIS — M16.12 ARTHRITIS OF LEFT HIP: ICD-10-CM

## 2018-12-10 DIAGNOSIS — H04.123 DRY EYES: ICD-10-CM

## 2018-12-10 DIAGNOSIS — Z96.642 HISTORY OF TOTAL LEFT HIP ARTHROPLASTY: ICD-10-CM

## 2018-12-10 DIAGNOSIS — K59.03 DRUG-INDUCED CONSTIPATION: Primary | ICD-10-CM

## 2018-12-10 LAB
ANION GAP SERPL CALCULATED.3IONS-SCNC: 8 MMOL/L (ref 3–14)
BUN SERPL-MCNC: 15 MG/DL (ref 7–30)
CALCIUM SERPL-MCNC: 10 MG/DL (ref 8.5–10.1)
CHLORIDE SERPL-SCNC: 103 MMOL/L (ref 94–109)
CO2 SERPL-SCNC: 29 MMOL/L (ref 20–32)
CREAT SERPL-MCNC: 1.07 MG/DL (ref 0.66–1.25)
ERYTHROCYTE [DISTWIDTH] IN BLOOD BY AUTOMATED COUNT: 13.3 % (ref 10–15)
GFR SERPL CREATININE-BSD FRML MDRD: 66 ML/MIN/1.7M2
GLUCOSE SERPL-MCNC: 122 MG/DL (ref 70–99)
HCT VFR BLD AUTO: 33.7 % (ref 40–53)
HGB BLD-MCNC: 10.9 G/DL (ref 13.3–17.7)
MCH RBC QN AUTO: 32.5 PG (ref 26.5–33)
MCHC RBC AUTO-ENTMCNC: 32.3 G/DL (ref 31.5–36.5)
MCV RBC AUTO: 101 FL (ref 78–100)
PLATELET # BLD AUTO: 332 10E9/L (ref 150–450)
POTASSIUM SERPL-SCNC: 3.8 MMOL/L (ref 3.4–5.3)
RBC # BLD AUTO: 3.35 10E12/L (ref 4.4–5.9)
SODIUM SERPL-SCNC: 140 MMOL/L (ref 133–144)
WBC # BLD AUTO: 9.1 10E9/L (ref 4–11)

## 2018-12-10 PROCEDURE — 99309 SBSQ NF CARE MODERATE MDM 30: CPT | Performed by: NURSE PRACTITIONER

## 2018-12-10 ASSESSMENT — MIFFLIN-ST. JEOR: SCORE: 1707.77

## 2018-12-10 NOTE — LETTER
"    12/10/2018        RE: Uriah Calloway  [address]  [facility] [address]        [place] [facility]    Chief Complaint   Patient presents with     senior living Acute       [place] Medical Record Number:  [id number]  Place of Service where encounter took place:  [facility] ([facility]) [[id number]]    HPI:    Uriah Calloway is a 84 year old  (7/22/1934), who is being seen today for an episodic care visit.  HPI information obtained from: facility chart records, facility staff and patient report. Today's concern is:  1. Drug-induced constipation    2. Dry eyes    3. Arthritis of left hip    4. History of total left hip arthroplasty      - nursing updated this NP that over the weekend Tavo needed to receive a suppository for constipation and asking to change his bowel regimen.  -Artificial tears needed to be clarified.  -Evaluated the use of the Tylenol #3 as for either 1 or 2 tabs being used.  Like to get rid of the range order if able.      Went to see Tavo in his room and his spouse was present.  She then was able to hear the updates as well as to say that [facility] Pred[facility]one [facility] Clind[facility] if [facility] starts having breathing trouble, it is there for him to start until he can be seen by a provider.    REVIEW OF SYSTEMS:  4 point ROS including Respiratory, CV, GI and , other than that noted in the HPI,  is negative    /74   Pulse 75   Temp 97.9  F (36.6  C)   Resp 20   Ht 1.778 m (5' 10\")   Wt 101.2 kg (223 lb)   SpO2 91%   BMI 32.00 kg/m     GENERAL APPEARANCE:  Alert, in no distress  Sitting in his wheelchair.  Pleasant to speak with.  Aquacel on anterior part of the left abdomen/hip joint.  Eyes do not show any redness with the sclera.  No rubbing noted.  Assist of one with mobility around the unit.  Able to do most of his ADLs on his own.  Attending therapies.  Takes almost 2 tabs of Tylenol #3 each time.  First documented BM was on the 9th as he was " admitted on 12/4/18    ASSESSMENT/PLAN:  1. Drug-induced constipation - has a PRN Senna Plus order for 1 tab BID prn.  Will change that to scheduled from PRN.  Staff can still use the JER for constipation.   2. Dry eyes - artificial tears do not say how many drops.  Clarify eye drops to be 1 drop each eye QID prn.     3. Arthritis of left hip - continues with therapies with goal of returning home with spouse.  Will proceed to change the Tylenol #3 to be 2 tabs every 4 hours.  Appears most of the time he receives the medication around the clock.     4. History of total left hip arthroplasty - Aquacel comes off today from nursing.  appt on the 12th with orthopedic MD in [place].        1.  Change Senna S to 1 tablet po BID.  Dx: constipation  2.  Clarify artificial tears 1 gtt ou QID PRN.  Dx: dry eyes  3.  Change Tylenol #3 order from 1-2 tabs to 2 tabs every 4 hours         Electronically signed by:  LUZ Roca CNP      Sincerely,        LUZ Roca CNP

## 2018-12-10 NOTE — PROGRESS NOTES
"[place] [facility]    Chief Complaint   Patient presents with     detention Acute       [place] Medical Record Number:  [id number]  Place of Service where encounter took place:  [facility] ([facility]) [[id number]]    HPI:    Uriah Calloway is a 84 year old  (7/22/1934), who is being seen today for an episodic care visit.  HPI information obtained from: facility chart records, facility staff and patient report. Today's concern is:  1. Drug-induced constipation    2. Dry eyes    3. Arthritis of left hip    4. History of total left hip arthroplasty      - nursing updated this NP that over the weekend Tavo needed to receive a suppository for constipation and asking to change his bowel regimen.  -Artificial tears needed to be clarified.  -Evaluated the use of the Tylenol #3 as for either 1 or 2 tabs being used.  Like to get rid of the range order if able.      Went to see Tavo in his room and his spouse was present.  She then was able to hear the updates as well as to say that the Prednisone and Clindamycin [facility] that if he starts having breathing trouble, it is there for him to start until he can be seen by a provider.    REVIEW OF SYSTEMS:  4 point ROS including Respiratory, CV, GI and , other than that noted in the HPI,  is negative    /74   Pulse 75   Temp 97.9  F (36.6  C)   Resp 20   Ht 1.778 m (5' 10\")   Wt 101.2 kg (223 lb)   SpO2 91%   BMI 32.00 kg/m    GENERAL APPEARANCE:  Alert, in no distress  Sitting in his wheelchair.  Pleasant to speak with.  Aquacel on anterior part of the left abdomen/hip joint.  Eyes do not show any redness with the sclera.  No rubbing noted.  Assist of one with mobility around the unit.  Able to do most of his ADLs on his own.  Attending therapies.  Takes almost 2 tabs of Tylenol #3 each time.  First documented BM was on the 9th as he was admitted on 12/4/18    ASSESSMENT/PLAN:  1. Drug-induced constipation - has a PRN " Senna Plus order for 1 tab BID prn.  Will change that to scheduled from PRN.  Staff can still use the JER for constipation.   2. Dry eyes - artificial tears do not say how many drops.  Clarify eye drops to be 1 drop each eye QID prn.     3. Arthritis of left hip - continues with therapies with goal of returning home with spouse.  Will proceed to change the Tylenol #3 to be 2 tabs every 4 hours.  Appears most of the time he receives the medication around the clock.     4. History of total left hip arthroplasty - Aquacel comes off today from nursing.  appt on the 12th with orthopedic MD in [place].        1.  Change Senna S to 1 tablet po BID.  Dx: constipation  2.  Clarify artificial tears 1 gtt ou QID PRN.  Dx: dry eyes  3.  Change Tylenol #3 order from 1-2 tabs to 2 tabs every 4 hours         Electronically signed by:  LUZ Roca CNP

## 2018-12-19 PROBLEM — N40.0 BENIGN PROSTATIC HYPERPLASIA WITHOUT LOWER URINARY TRACT SYMPTOMS: Status: ACTIVE | Noted: 2018-12-19

## 2018-12-19 PROBLEM — E11.9 TYPE 2 DIABETES MELLITUS WITHOUT COMPLICATION, WITHOUT LONG-TERM CURRENT USE OF INSULIN (H): Status: ACTIVE | Noted: 2018-12-19

## 2018-12-19 PROBLEM — I25.2 OLD MYOCARDIAL INFARCTION: Status: ACTIVE | Noted: 2018-12-19

## 2018-12-19 PROBLEM — I50.9 CHRONIC CONGESTIVE HEART FAILURE, UNSPECIFIED HEART FAILURE TYPE (H): Status: ACTIVE | Noted: 2018-12-19

## 2018-12-19 PROBLEM — Z96.642 HISTORY OF TOTAL LEFT HIP ARTHROPLASTY: Status: ACTIVE | Noted: 2018-12-19

## 2018-12-19 PROBLEM — I10 ESSENTIAL HYPERTENSION: Status: ACTIVE | Noted: 2018-12-19

## 2018-12-19 PROBLEM — J44.9 CHRONIC OBSTRUCTIVE PULMONARY DISEASE, UNSPECIFIED COPD TYPE (H): Status: ACTIVE | Noted: 2018-12-19

## 2018-12-19 PROBLEM — R12 CHRONIC HEARTBURN: Status: ACTIVE | Noted: 2018-12-19

## 2018-12-19 PROBLEM — M16.12 ARTHRITIS OF LEFT HIP: Status: ACTIVE | Noted: 2018-12-19
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
## 2018-12-20 ENCOUNTER — NURSING HOME VISIT (OUTPATIENT)
Dept: GERIATRICS | Facility: CLINIC | Age: 83
End: 2018-12-20
Payer: COMMERCIAL

## 2018-12-20 VITALS
HEART RATE: 80 BPM | TEMPERATURE: 97.6 F | SYSTOLIC BLOOD PRESSURE: 123 MMHG | RESPIRATION RATE: 18 BRPM | DIASTOLIC BLOOD PRESSURE: 66 MMHG | HEIGHT: 70 IN | OXYGEN SATURATION: 93 % | WEIGHT: 216.8 LBS | BODY MASS INDEX: 31.04 KG/M2

## 2018-12-20 DIAGNOSIS — Z47.1 AFTERCARE FOLLOWING LEFT HIP JOINT REPLACEMENT SURGERY: Primary | ICD-10-CM

## 2018-12-20 DIAGNOSIS — Z96.642 AFTERCARE FOLLOWING LEFT HIP JOINT REPLACEMENT SURGERY: Primary | ICD-10-CM

## 2018-12-20 DIAGNOSIS — Z96.642 HISTORY OF TOTAL LEFT HIP ARTHROPLASTY: ICD-10-CM

## 2018-12-20 DIAGNOSIS — N40.0 BENIGN PROSTATIC HYPERPLASIA WITHOUT LOWER URINARY TRACT SYMPTOMS: ICD-10-CM

## 2018-12-20 DIAGNOSIS — I10 ESSENTIAL HYPERTENSION: ICD-10-CM

## 2018-12-20 DIAGNOSIS — I50.9 CHRONIC CONGESTIVE HEART FAILURE, UNSPECIFIED HEART FAILURE TYPE (H): ICD-10-CM

## 2018-12-20 DIAGNOSIS — J44.9 CHRONIC OBSTRUCTIVE PULMONARY DISEASE, UNSPECIFIED COPD TYPE (H): ICD-10-CM

## 2018-12-20 DIAGNOSIS — E11.9 TYPE 2 DIABETES MELLITUS WITHOUT COMPLICATION, WITHOUT LONG-TERM CURRENT USE OF INSULIN (H): ICD-10-CM

## 2018-12-20 PROCEDURE — 99305 1ST NF CARE MODERATE MDM 35: CPT | Performed by: FAMILY MEDICINE

## 2018-12-20 ASSESSMENT — MIFFLIN-ST. JEOR: SCORE: 1679.65
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
## 2019-01-03 ENCOUNTER — NURSING HOME VISIT (OUTPATIENT)
Dept: GERIATRICS | Facility: CLINIC | Age: 84
End: 2019-01-03
Payer: COMMERCIAL

## 2019-01-03 VITALS
SYSTOLIC BLOOD PRESSURE: 123 MMHG | HEART RATE: 77 BPM | RESPIRATION RATE: 18 BRPM | WEIGHT: 211.2 LBS | DIASTOLIC BLOOD PRESSURE: 63 MMHG | HEIGHT: 70 IN | BODY MASS INDEX: 30.24 KG/M2 | TEMPERATURE: 97.6 F | OXYGEN SATURATION: 96 %

## 2019-01-03 DIAGNOSIS — J44.9 CHRONIC OBSTRUCTIVE PULMONARY DISEASE, UNSPECIFIED COPD TYPE (H): ICD-10-CM

## 2019-01-03 DIAGNOSIS — I25.2 OLD MYOCARDIAL INFARCTION: ICD-10-CM

## 2019-01-03 DIAGNOSIS — Z96.642 HISTORY OF TOTAL LEFT HIP ARTHROPLASTY: ICD-10-CM

## 2019-01-03 DIAGNOSIS — I50.9 CHRONIC CONGESTIVE HEART FAILURE, UNSPECIFIED HEART FAILURE TYPE (H): ICD-10-CM

## 2019-01-03 DIAGNOSIS — I10 ESSENTIAL HYPERTENSION: ICD-10-CM

## 2019-01-03 DIAGNOSIS — E11.9 TYPE 2 DIABETES MELLITUS WITHOUT COMPLICATION, WITHOUT LONG-TERM CURRENT USE OF INSULIN (H): ICD-10-CM

## 2019-01-03 DIAGNOSIS — M16.12 ARTHRITIS OF LEFT HIP: Primary | ICD-10-CM

## 2019-01-03 PROCEDURE — 99316 NF DSCHRG MGMT 30 MIN+: CPT | Performed by: NURSE PRACTITIONER

## 2019-01-03 ASSESSMENT — MIFFLIN-ST. JEOR: SCORE: 1654.25
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
## 2019-01-30 ENCOUNTER — MEDICAL CORRESPONDENCE (OUTPATIENT)
Dept: HEALTH INFORMATION MANAGEMENT | Facility: CLINIC | Age: 84
End: 2019-01-30

## 2019-02-13 ENCOUNTER — HOSPITAL ENCOUNTER (OUTPATIENT)
Dept: PHYSICAL THERAPY | Facility: CLINIC | Age: 84
Setting detail: THERAPIES SERIES
End: 2019-02-13
Attending: ORTHOPAEDIC SURGERY
Payer: MEDICARE

## 2019-02-13 PROCEDURE — 97110 THERAPEUTIC EXERCISES: CPT | Mod: GP | Performed by: PHYSICAL THERAPIST

## 2019-02-13 PROCEDURE — 97162 PT EVAL MOD COMPLEX 30 MIN: CPT | Mod: GP | Performed by: PHYSICAL THERAPIST

## 2019-02-13 NOTE — PROGRESS NOTES
[facility]          OUTPATIENT PHYSICAL THERAPY ORTHOPEDIC EVALUATION  PLAN OF TREATMENT FOR OUTPATIENT REHABILITATION  (COMPLETE FOR INITIAL CLAIMS ONLY)  Patient's Last Name, First Name, M.I.  YOB: 1934  Uriah Calloway    Provider s Name:  [facility]   Medical Record No.  [id number]   Start of Care Date:  02/13/19   Onset Date:  11/29/18   Type:     _X__PT   ___OT   ___SLP Medical Diagnosis:  weakness, spinal stenosis, s/p L SAUNDRA     PT Diagnosis:  pain, decreased strength, decreased activity tolerance, decreased ROM, impaired gait, impaired balance   Visits from SOC:  1      _________________________________________________________________________________  Plan of Treatment/Functional Goals:  ADL retraining, balance training, bed mobility training, gait training, joint mobilization, manual therapy, neuromuscular re-education, ROM, strengthening, stretching, transfer training, wheelchair management/propulsion training     Cryotherapy(as needed for symptom management)     Goals  Goal Identifier: transfers  Goal Description: Pt will demonstrate supervision with sit<>stand and sit<> supine with supervision for safety and no greater than 5/10 L LE pain.  Target Date: 05/07/19    Goal Identifier: sleep  Goal Description: Pt will be able to sleep for 5 hours without waking up due to pain.  Target Date: 05/07/19    Goal Identifier: MERARY  Goal Description: Pt will score no less than 40 on MERARY to demonstrate improved functional mobility and decreased pain.  Target Date: 05/07/19                Therapy Frequency:  2 times/Week  Predicted Duration of Therapy Intervention:  12 weeks    Carol Heard, PT                 I CERTIFY THE NEED FOR THESE SERVICES FURNISHED UNDER        THIS PLAN OF TREATMENT AND WHILE UNDER MY CARE .             Physician Signature               Date    X_____________________________________________________                              Certification Date From:  02/13/19   Certification Date To:  05/13/19    Referring Provider:  Dr. Luo    Initial Assessment        See Epic Evaluation Start of Care Date: 02/13/19             Thank you for your referral.    Carol Heard, PT, DTP  [facility]  [phone]

## 2019-02-18 ENCOUNTER — TRANSFERRED RECORDS (OUTPATIENT)
Dept: HEALTH INFORMATION MANAGEMENT | Facility: CLINIC | Age: 84
End: 2019-02-18

## 2019-03-01 ENCOUNTER — HOSPITAL ENCOUNTER (OUTPATIENT)
Dept: PHYSICAL THERAPY | Facility: CLINIC | Age: 84
Setting detail: THERAPIES SERIES
End: 2019-03-01
Attending: ORTHOPAEDIC SURGERY
Payer: MEDICARE

## 2019-03-01 PROCEDURE — 97140 MANUAL THERAPY 1/> REGIONS: CPT | Mod: GP | Performed by: PHYSICAL THERAPIST

## 2019-03-01 PROCEDURE — 97110 THERAPEUTIC EXERCISES: CPT | Mod: GP | Performed by: PHYSICAL THERAPIST

## 2019-03-06 ENCOUNTER — HOSPITAL ENCOUNTER (OUTPATIENT)
Dept: PHYSICAL THERAPY | Facility: CLINIC | Age: 84
Setting detail: THERAPIES SERIES
End: 2019-03-06
Attending: ORTHOPAEDIC SURGERY
Payer: MEDICARE

## 2019-03-06 PROCEDURE — 97530 THERAPEUTIC ACTIVITIES: CPT | Mod: GP

## 2019-03-06 PROCEDURE — 97110 THERAPEUTIC EXERCISES: CPT | Mod: GP

## 2019-03-13 ENCOUNTER — HOSPITAL ENCOUNTER (OUTPATIENT)
Dept: PHYSICAL THERAPY | Facility: CLINIC | Age: 84
Setting detail: THERAPIES SERIES
End: 2019-03-13
Attending: ORTHOPAEDIC SURGERY
Payer: MEDICARE

## 2019-03-13 PROCEDURE — 97110 THERAPEUTIC EXERCISES: CPT | Mod: GP | Performed by: PHYSICAL THERAPIST

## 2019-03-15 ENCOUNTER — HOSPITAL ENCOUNTER (OUTPATIENT)
Dept: PHYSICAL THERAPY | Facility: CLINIC | Age: 84
Setting detail: THERAPIES SERIES
End: 2019-03-15
Attending: ORTHOPAEDIC SURGERY
Payer: MEDICARE

## 2019-03-15 PROCEDURE — 97110 THERAPEUTIC EXERCISES: CPT | Mod: GP | Performed by: PHYSICAL THERAPIST

## 2019-03-15 PROCEDURE — 97140 MANUAL THERAPY 1/> REGIONS: CPT | Mod: GP | Performed by: PHYSICAL THERAPIST

## 2019-03-18 ENCOUNTER — HOSPITAL ENCOUNTER (OUTPATIENT)
Dept: PHYSICAL THERAPY | Facility: CLINIC | Age: 84
Setting detail: THERAPIES SERIES
End: 2019-03-18
Attending: ORTHOPAEDIC SURGERY
Payer: MEDICARE

## 2019-03-18 PROCEDURE — 97110 THERAPEUTIC EXERCISES: CPT | Mod: GP | Performed by: PHYSICAL THERAPIST

## 2019-03-18 PROCEDURE — 97140 MANUAL THERAPY 1/> REGIONS: CPT | Mod: GP | Performed by: PHYSICAL THERAPIST

## 2019-03-22 ENCOUNTER — HOSPITAL ENCOUNTER (OUTPATIENT)
Dept: PHYSICAL THERAPY | Facility: CLINIC | Age: 84
Setting detail: THERAPIES SERIES
End: 2019-03-22
Attending: ORTHOPAEDIC SURGERY
Payer: MEDICARE

## 2019-03-22 PROCEDURE — 97110 THERAPEUTIC EXERCISES: CPT | Mod: GP | Performed by: PHYSICAL THERAPIST

## 2019-03-22 PROCEDURE — 97112 NEUROMUSCULAR REEDUCATION: CPT | Mod: GP | Performed by: PHYSICAL THERAPIST

## 2019-03-25 ENCOUNTER — HOSPITAL ENCOUNTER (OUTPATIENT)
Dept: PHYSICAL THERAPY | Facility: CLINIC | Age: 84
Setting detail: THERAPIES SERIES
End: 2019-03-25
Attending: ORTHOPAEDIC SURGERY
Payer: MEDICARE

## 2019-03-25 PROCEDURE — 97110 THERAPEUTIC EXERCISES: CPT | Mod: GP | Performed by: PHYSICAL THERAPIST

## 2019-03-29 ENCOUNTER — HOSPITAL ENCOUNTER (OUTPATIENT)
Dept: PHYSICAL THERAPY | Facility: CLINIC | Age: 84
Setting detail: THERAPIES SERIES
End: 2019-03-29
Attending: ORTHOPAEDIC SURGERY
Payer: MEDICARE

## 2019-03-29 PROCEDURE — 97140 MANUAL THERAPY 1/> REGIONS: CPT | Mod: GP | Performed by: PHYSICAL THERAPIST

## 2019-03-29 PROCEDURE — 97110 THERAPEUTIC EXERCISES: CPT | Mod: GP | Performed by: PHYSICAL THERAPIST

## 2019-04-01 ENCOUNTER — HOSPITAL ENCOUNTER (OUTPATIENT)
Dept: PHYSICAL THERAPY | Facility: CLINIC | Age: 84
Setting detail: THERAPIES SERIES
End: 2019-04-01
Attending: ORTHOPAEDIC SURGERY
Payer: MEDICARE

## 2019-04-01 PROCEDURE — 97110 THERAPEUTIC EXERCISES: CPT | Mod: GP | Performed by: PHYSICAL THERAPIST

## 2019-04-01 NOTE — PROGRESS NOTES
Outpatient Physical Therapy Progress Note     Patient: Uriah Calloway  : 1934    Beginning/End Dates of Reporting Period:  2019 to 2019    Referring Provider: Dr. Luo    Therapy Diagnosis: pain, decreased strength, decreased activity tolerance, decreased ROM, impaired gait, impaired balance     Client Self Report: THe pt states that his pain is a little less since starting physical therapy. Pt's wife notes that he gets in and out of chairs much easier now. Pt notes he was very sore the next day after his physical therapy session.    Objective Measurements:  Objective Measure: HEp  Details: Pt is compliant with his exercises 1x a day.  Objective Measure: MERARY  Details:      Outcome Measures (most recent score):  Oly STarT    Oly STarT    Oswestry Score (%): 68 %    Goals:  Goal Identifier transfers   Goal Description Pt will demonstrate supervision with sit<>stand and sit<> supine with supervision for safety and no greater than 5/10 L LE pain.   Target Date 19   Date Met  19   Progress: goal met     Goal Identifier sleep   Goal Description Pt will be able to sleep for 5 hours without waking up due to pain.   Target Date 19   Date Met  (Pt sleeps about 4 hours without waking up due to pain)   Progress: Pt is slowly progressing toward goal and is demonstrating and reporting overall reduced pain.     Goal Identifier MERARY   Goal Description Pt will score no less than 40 on MERARY to demonstrate improved functional mobility and decreased pain.   Target Date 19   Date Met  (pt scored 68/80 2019)   Progress: Pt is slowly progressing toward goal. Due to his slow progress and complex medical history he may not reach original goal of less than 40 on MERARY.         Progress Toward Goals:   Progress this reporting period: Pt is slowly progressing towards goals. He reports diligently performing HEP daily. Overall he as reported decreased pain with functional mobility and ADLs.  He has demonstrated reduced assistance to supervision with sit<>stands. The pt will continue to benefit from skilled physical therapy to address pain, strength, ROM, flexibility, balance, and gait to help him reach his goals. Please review goals section for progress on specific goals.      Plan:  Continue therapy per current plan of care.    Discharge:  No    Thank you for your referral.    Carol Heard, PT, DTP  [facility]  [phone]

## 2019-04-05 ENCOUNTER — HOSPITAL ENCOUNTER (OUTPATIENT)
Dept: PHYSICAL THERAPY | Facility: CLINIC | Age: 84
Setting detail: THERAPIES SERIES
End: 2019-04-05
Attending: ORTHOPAEDIC SURGERY
Payer: MEDICARE

## 2019-04-05 PROCEDURE — 97110 THERAPEUTIC EXERCISES: CPT | Mod: GP | Performed by: PHYSICAL THERAPIST

## 2019-04-05 PROCEDURE — 97112 NEUROMUSCULAR REEDUCATION: CPT | Mod: GP | Performed by: PHYSICAL THERAPIST

## 2019-04-10 ENCOUNTER — HOSPITAL ENCOUNTER (OUTPATIENT)
Dept: PHYSICAL THERAPY | Facility: CLINIC | Age: 84
Setting detail: THERAPIES SERIES
End: 2019-04-10
Attending: ORTHOPAEDIC SURGERY
Payer: MEDICARE

## 2019-04-10 PROCEDURE — 97110 THERAPEUTIC EXERCISES: CPT | Mod: GP | Performed by: PHYSICAL THERAPIST

## 2019-04-12 ENCOUNTER — HOSPITAL ENCOUNTER (OUTPATIENT)
Dept: PHYSICAL THERAPY | Facility: CLINIC | Age: 84
Setting detail: THERAPIES SERIES
End: 2019-04-12
Attending: ORTHOPAEDIC SURGERY
Payer: MEDICARE

## 2019-04-12 PROCEDURE — 97110 THERAPEUTIC EXERCISES: CPT | Mod: GP | Performed by: PHYSICAL THERAPIST

## 2019-04-15 ENCOUNTER — HOSPITAL ENCOUNTER (OUTPATIENT)
Dept: PHYSICAL THERAPY | Facility: CLINIC | Age: 84
Setting detail: THERAPIES SERIES
End: 2019-04-15
Attending: ORTHOPAEDIC SURGERY
Payer: MEDICARE

## 2019-04-15 PROCEDURE — 97110 THERAPEUTIC EXERCISES: CPT | Mod: GP

## 2019-04-19 ENCOUNTER — HOSPITAL ENCOUNTER (OUTPATIENT)
Dept: PHYSICAL THERAPY | Facility: CLINIC | Age: 84
Setting detail: THERAPIES SERIES
End: 2019-04-19
Attending: ORTHOPAEDIC SURGERY
Payer: MEDICARE

## 2019-04-19 PROCEDURE — 97110 THERAPEUTIC EXERCISES: CPT | Mod: GP | Performed by: PHYSICAL THERAPIST

## 2019-04-29 ENCOUNTER — HOSPITAL ENCOUNTER (OUTPATIENT)
Dept: PHYSICAL THERAPY | Facility: CLINIC | Age: 84
Setting detail: THERAPIES SERIES
End: 2019-04-29
Attending: ORTHOPAEDIC SURGERY
Payer: MEDICARE

## 2019-04-29 PROCEDURE — 97110 THERAPEUTIC EXERCISES: CPT | Mod: GP | Performed by: PHYSICAL THERAPIST

## 2019-05-03 ENCOUNTER — HOSPITAL ENCOUNTER (OUTPATIENT)
Dept: PHYSICAL THERAPY | Facility: CLINIC | Age: 84
Setting detail: THERAPIES SERIES
End: 2019-05-03
Attending: ORTHOPAEDIC SURGERY
Payer: MEDICARE

## 2019-05-03 PROCEDURE — 97530 THERAPEUTIC ACTIVITIES: CPT | Mod: GP

## 2019-05-03 PROCEDURE — 97110 THERAPEUTIC EXERCISES: CPT | Mod: GP

## 2019-05-03 NOTE — PROGRESS NOTES
[facility]      OUTPATIENT PHYSICAL THERAPY  PLAN OF TREATMENT FOR OUTPATIENT REHABILITATION    Patient's Last Name, First Name, M.I.                YOB: 1934  BrodieUriah  VIKTOR                        Provider's Name  [facility] Medical Record No.  [id number]                               Onset Date: 11/19/18    Start of Care Date: 2/13/19   Type:     _X_PT   ___OT   ___SLP Medical Diagnosis: weakness, spinal stenosis, s/p L SAUNDRA                       PT Diagnosis: pain, decreased strength, decreased activity tolerance, decreased ROM, impaired gait, impaired balance      _________________________________________________________________________________  Plan of Treatment:    Frequency/Duration: 1-2 x per week for additional 8 weeks     Goals:  Goal Identifier transfers   Goal Description Pt will demonstrate supervision with sit<>stand and sit<> supine with supervision for safety and no greater than 5/10 L LE pain.   Target Date 05/07/19   Date Met  04/01/19   Progress: Goal met.      Goal Identifier sleep   Goal Description Pt will be able to sleep for 5 hours without waking up due to pain.   Target Date 05/07/19   Date Met     Progress: Pt sleeps about 4 hours before waking up due to pain     Goal Identifier MERARY   Goal Description Pt will score no less than 40 on MERARY to demonstrate improved functional mobility and decreased pain.   Target Date 05/07/19   Date Met     Progress: pt scored 72/80 4/1/2019     Goal Identifier TUG   Goal Description Patient will demonstrate improvement in Timed Up and Go (TUG) test, with time of 30 seconds or less and use of 2WW in order to exhibit improvements in safe ambulation and functional dynamic balance.   Target Date 06/28/19   Date Met      Progress: Patient scored 35.       Progress Toward Goals:   Progress this reporting period: Patient  continues to slowly and steadily progress with LE strength and functional mobility. Patient reports slight reduction in pain since initial evaluation. Still continues to have pain throughout night however this is decreasing. Patient has decreased time on TUG testing, demonstrating improvements in safe mobility. Patient will benefit from continues skilled physical therapy intervention to progress towards functional goals and decrease pain.          Certification date from 5/3/2019 to 7/2/2019    Orly Angela, PT, DPT         I CERTIFY THE NEED FOR THESE SERVICES FURNISHED UNDER        THIS PLAN OF TREATMENT AND WHILE UNDER MY CARE .             Physician Signature               Date    X_____________________________________________________                      Referring Provider: Dr. Luo

## 2019-05-17 ENCOUNTER — HOSPITAL ENCOUNTER (OUTPATIENT)
Dept: PHYSICAL THERAPY | Facility: CLINIC | Age: 84
Setting detail: THERAPIES SERIES
End: 2019-05-17
Attending: ORTHOPAEDIC SURGERY
Payer: MEDICARE

## 2019-05-17 PROCEDURE — 97110 THERAPEUTIC EXERCISES: CPT | Mod: GP

## 2019-05-17 PROCEDURE — 97530 THERAPEUTIC ACTIVITIES: CPT | Mod: GP

## 2019-05-22 ENCOUNTER — MEDICAL CORRESPONDENCE (OUTPATIENT)
Dept: HEALTH INFORMATION MANAGEMENT | Facility: CLINIC | Age: 84
End: 2019-05-22

## 2019-06-03 ENCOUNTER — HOSPITAL ENCOUNTER (OUTPATIENT)
Dept: PHYSICAL THERAPY | Facility: CLINIC | Age: 84
Setting detail: THERAPIES SERIES
End: 2019-06-03
Payer: MEDICARE

## 2019-06-03 PROCEDURE — 97161 PT EVAL LOW COMPLEX 20 MIN: CPT | Mod: GP

## 2019-06-03 PROCEDURE — 97110 THERAPEUTIC EXERCISES: CPT | Mod: GP

## 2019-06-03 NOTE — PROGRESS NOTES
[facility]          OUTPATIENT PHYSICAL THERAPY ORTHOPEDIC EVALUATION  PLAN OF TREATMENT FOR OUTPATIENT REHABILITATION  (COMPLETE FOR INITIAL CLAIMS ONLY)  Patient's Last Name, First Name, M.I.  YOB: 1934  Uriah Calloway    Provider s Name:  [facility]   Medical Record No.  [id number]   Start of Care Date:  06/03/19   Onset Date:  06/03/18   Type:     _X__PT   ___OT   ___SLP Medical Diagnosis:  Muscle weakness ; Left shoulder pain, unspecified chronicity     PT Diagnosis:  Decreased bilateral shoulder ROM and UE strength, Increased pain with all L shoulder motions   Visits from SOC:  1      _________________________________________________________________________________  Plan of Treatment/Functional Goals:  joint mobilization, manual therapy, neuromuscular re-education, ROM, strengthening, stretching  other interventions as indicated    Cryotherapy, Ultrasound  other modalities as indicated    Goals  Goal Identifier: SPADI  Goal Description: Patient will improve SPADI score by at least 20 points in order to demonstrate functional improvements.  Target Date: 07/29/19    Goal Identifier: Strength  Goal Description: Patient will exhibit manual muscle testing of 4/5 or better throughout L shoulder to demonstrate improved strength needed for carrying small objects and pushing doors.  Target Date: 08/26/19    Goal Identifier: ROM  Goal Description: Patient will exhibit L shoulder ROM equal to R to demonstrate improved mobility needed for daily activities like reaching behind his back to tuck his shirt in or putting his seatbelt on.  Target Date: 08/26/19               Therapy Frequency:  2 times/Week  Predicted Duration of Therapy Intervention:  1-2 x per week for up to 12 weeks    Orly Angela, PT, DPT                 I CERTIFY THE NEED FOR THESE SERVICES FURNISHED UNDER        THIS PLAN OF TREATMENT AND WHILE UNDER MY CARE .             Physician  Signature               Date    X_____________________________________________________                             Certification Date From:  06/03/19   Certification Date To:  09/01/19    Referring Provider:  Dr. Aline Luo    Initial Assessment        See Epic Evaluation Start of Care Date: 06/03/19
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
## 2019-06-06 ENCOUNTER — HOSPITAL ENCOUNTER (OUTPATIENT)
Dept: PHYSICAL THERAPY | Facility: CLINIC | Age: 84
Setting detail: THERAPIES SERIES
End: 2019-06-06
Attending: ORTHOPAEDIC SURGERY
Payer: MEDICARE

## 2019-06-06 PROCEDURE — 97116 GAIT TRAINING THERAPY: CPT | Mod: GP

## 2019-06-06 PROCEDURE — 97140 MANUAL THERAPY 1/> REGIONS: CPT | Mod: GP

## 2019-06-06 PROCEDURE — 97110 THERAPEUTIC EXERCISES: CPT | Mod: GP

## 2019-06-14 ENCOUNTER — HOSPITAL ENCOUNTER (OUTPATIENT)
Dept: PHYSICAL THERAPY | Facility: CLINIC | Age: 84
Setting detail: THERAPIES SERIES
End: 2019-06-14
Attending: ORTHOPAEDIC SURGERY
Payer: MEDICARE

## 2019-06-14 PROCEDURE — 97110 THERAPEUTIC EXERCISES: CPT | Mod: GP

## 2019-06-14 PROCEDURE — 97116 GAIT TRAINING THERAPY: CPT | Mod: GP

## 2019-06-14 PROCEDURE — 97140 MANUAL THERAPY 1/> REGIONS: CPT | Mod: GP,KX

## 2019-06-17 ENCOUNTER — HOSPITAL ENCOUNTER (OUTPATIENT)
Dept: PHYSICAL THERAPY | Facility: CLINIC | Age: 84
Setting detail: THERAPIES SERIES
End: 2019-06-17
Attending: ORTHOPAEDIC SURGERY
Payer: MEDICARE

## 2019-06-17 PROCEDURE — 97110 THERAPEUTIC EXERCISES: CPT | Mod: GP,KX

## 2019-06-17 PROCEDURE — 97140 MANUAL THERAPY 1/> REGIONS: CPT | Mod: GP,KX

## 2019-06-24 ENCOUNTER — HOSPITAL ENCOUNTER (OUTPATIENT)
Dept: PHYSICAL THERAPY | Facility: CLINIC | Age: 84
Setting detail: THERAPIES SERIES
End: 2019-06-24
Attending: ORTHOPAEDIC SURGERY
Payer: MEDICARE

## 2019-06-24 PROCEDURE — 97116 GAIT TRAINING THERAPY: CPT | Mod: GP

## 2019-06-24 PROCEDURE — 97110 THERAPEUTIC EXERCISES: CPT | Mod: GP,KX

## 2019-06-24 PROCEDURE — 97140 MANUAL THERAPY 1/> REGIONS: CPT | Mod: GP,KX

## 2019-06-24 NOTE — ADDENDUM NOTE
Encounter addended by: Orly Angela PT on: 6/24/2019 3:27 PM   Actions taken: Charge Capture section accepted

## 2019-06-28 ENCOUNTER — HOSPITAL ENCOUNTER (OUTPATIENT)
Dept: PHYSICAL THERAPY | Facility: CLINIC | Age: 84
Setting detail: THERAPIES SERIES
End: 2019-06-28
Attending: ORTHOPAEDIC SURGERY
Payer: MEDICARE

## 2019-06-28 PROCEDURE — 97110 THERAPEUTIC EXERCISES: CPT | Mod: GP

## 2019-06-28 PROCEDURE — 97116 GAIT TRAINING THERAPY: CPT | Mod: GP,KX

## 2019-07-01 ENCOUNTER — HOSPITAL ENCOUNTER (OUTPATIENT)
Dept: PHYSICAL THERAPY | Facility: CLINIC | Age: 84
Setting detail: THERAPIES SERIES
End: 2019-07-01
Attending: ORTHOPAEDIC SURGERY
Payer: MEDICARE

## 2019-07-01 PROCEDURE — 97110 THERAPEUTIC EXERCISES: CPT | Mod: GP,KX

## 2019-07-01 PROCEDURE — 97116 GAIT TRAINING THERAPY: CPT | Mod: GP

## 2019-07-05 ENCOUNTER — HOSPITAL ENCOUNTER (OUTPATIENT)
Dept: PHYSICAL THERAPY | Facility: CLINIC | Age: 84
Setting detail: THERAPIES SERIES
End: 2019-07-05
Attending: ORTHOPAEDIC SURGERY
Payer: MEDICARE

## 2019-07-05 PROCEDURE — 97116 GAIT TRAINING THERAPY: CPT | Mod: GP,KX

## 2019-07-05 PROCEDURE — 97110 THERAPEUTIC EXERCISES: CPT | Mod: GP,KX

## 2019-07-08 ENCOUNTER — HOSPITAL ENCOUNTER (OUTPATIENT)
Dept: PHYSICAL THERAPY | Facility: CLINIC | Age: 84
Setting detail: THERAPIES SERIES
End: 2019-07-08
Attending: ORTHOPAEDIC SURGERY
Payer: MEDICARE

## 2019-07-08 PROCEDURE — 97110 THERAPEUTIC EXERCISES: CPT | Mod: GP,KX

## 2019-07-08 PROCEDURE — 97116 GAIT TRAINING THERAPY: CPT | Mod: GP,KX

## 2019-07-12 ENCOUNTER — HOSPITAL ENCOUNTER (OUTPATIENT)
Dept: PHYSICAL THERAPY | Facility: CLINIC | Age: 84
Setting detail: THERAPIES SERIES
End: 2019-07-12
Attending: ORTHOPAEDIC SURGERY
Payer: MEDICARE

## 2019-07-12 PROCEDURE — 97110 THERAPEUTIC EXERCISES: CPT | Mod: GP,KX

## 2019-07-15 ENCOUNTER — HOSPITAL ENCOUNTER (OUTPATIENT)
Dept: PHYSICAL THERAPY | Facility: CLINIC | Age: 84
Setting detail: THERAPIES SERIES
End: 2019-07-15
Attending: ORTHOPAEDIC SURGERY
Payer: MEDICARE

## 2019-07-15 PROCEDURE — 97110 THERAPEUTIC EXERCISES: CPT | Mod: GP,KX

## 2019-07-15 NOTE — ADDENDUM NOTE
Encounter addended by: Orly Angela PT on: 7/15/2019 10:16 AM   Actions taken: Pend clinical note, Flowsheet accepted, Sign clinical note

## 2019-07-15 NOTE — PROGRESS NOTES
Outpatient Physical Therapy Progress Note     Patient: Uriah Calloway  : 1934    Beginning/End Dates of Reporting Period:  6/3/2019 to 2019    Referring Provider: Dr. Aline Luo    Therapy Diagnosis: Muscle weakness ; Left shoulder pain, unspecified chronicity     Client Self Report: Patient reports shoulder pain is a constant 5/10 today. Reports his back pain is limiting him with walking too much, however he does get up and walk around his house often with his walker. Overall, he believes strength is improving however pain is the limiting factor.    Objective Measurements:  Objective Measure: Shoulder Pain  Details: 5/10    Objective Measure: Low Back Pain  Details: 9/10, severe today    Objective Measure: SPADI  Details: 94.55    Objective Measure: ROM  Details: L shoulder flexion 90 deg, ER at 45 deg    Objective Measure: Shoulder strength  Details: IR 4+/5, ER 4/5, flexion & abduction 4-/5 with pain         Outcome Measures (most recent score):  SPADI:      Goals:  Goal Identifier SPADI   Goal Description Patient will improve SPADI score by at least 20 points in order to demonstrate functional improvements.   Target Date 19   Date Met      Progress: Not met. Pain is limiting factor. Continuing to focus on functional goals.      Goal Identifier Strength   Goal Description Patient will exhibit manual muscle testing of 4/5 or better throughout L shoulder to demonstrate improved strength needed for carrying small objects and pushing doors.   Target Date 19   Date Met      Progress: Improved ER/IR, deficits in flexion & abduction (4-/5)     Goal Identifier ROM   Goal Description Patient will exhibit L shoulder ROM equal to R to demonstrate improved mobility needed for daily activities like reaching behind his back to tuck his shirt in or putting his seatbelt on.   Target Date 19   Date Met      Progress: Significant improvements in ROM (see above), continuing to increase ROM without  pain          Progress Toward Goals:   Progress this reporting period: Ronnie continues to improve with L shoulder strength & ROM as well as generalized LE & core strength. Ronnie continues to c/o pain being limited factor with shoulder and back. Ronnie has increased L shoulder flexion ROM by 45 degrees, however still having increased pain at end range. Additionally, patient is improving with functional strengthening such as increased ability to perform sit<>stands at low mat and increased endurance with ambulating with 2WW. Patient will benefit from continued skilled physical therapy to achieve functional goals.        Plan:  Continue therapy per current plan of care.    Discharge:  No        Thank you for your referral.     Orly Angela, PT, DPT    [facility]    O: [phone]  E: [email or web address]

## 2019-07-19 ENCOUNTER — HOSPITAL ENCOUNTER (OUTPATIENT)
Dept: PHYSICAL THERAPY | Facility: CLINIC | Age: 84
Setting detail: THERAPIES SERIES
End: 2019-07-19
Attending: ORTHOPAEDIC SURGERY
Payer: MEDICARE

## 2019-07-19 PROCEDURE — 97110 THERAPEUTIC EXERCISES: CPT | Mod: GP,KX

## 2019-07-29 ENCOUNTER — HOSPITAL ENCOUNTER (OUTPATIENT)
Dept: PHYSICAL THERAPY | Facility: CLINIC | Age: 84
Setting detail: THERAPIES SERIES
End: 2019-07-29
Attending: ORTHOPAEDIC SURGERY
Payer: MEDICARE

## 2019-07-29 PROCEDURE — 97110 THERAPEUTIC EXERCISES: CPT | Mod: GP,KX

## 2019-08-01 ENCOUNTER — HOSPITAL ENCOUNTER (OUTPATIENT)
Dept: PHYSICAL THERAPY | Facility: CLINIC | Age: 84
Setting detail: THERAPIES SERIES
End: 2019-08-01
Attending: ORTHOPAEDIC SURGERY
Payer: MEDICARE

## 2019-08-01 PROCEDURE — 97110 THERAPEUTIC EXERCISES: CPT | Mod: GP,KX

## 2019-08-05 ENCOUNTER — HOSPITAL ENCOUNTER (OUTPATIENT)
Dept: PHYSICAL THERAPY | Facility: CLINIC | Age: 84
Setting detail: THERAPIES SERIES
End: 2019-08-05
Attending: ORTHOPAEDIC SURGERY
Payer: MEDICARE

## 2019-08-05 PROCEDURE — 97110 THERAPEUTIC EXERCISES: CPT | Mod: GP,KX

## 2019-08-12 ENCOUNTER — TRANSFERRED RECORDS (OUTPATIENT)
Dept: HEALTH INFORMATION MANAGEMENT | Facility: CLINIC | Age: 84
End: 2019-08-12

## 2019-08-12 ENCOUNTER — HOSPITAL ENCOUNTER (OUTPATIENT)
Dept: PHYSICAL THERAPY | Facility: CLINIC | Age: 84
Setting detail: THERAPIES SERIES
End: 2019-08-12
Attending: ORTHOPAEDIC SURGERY
Payer: MEDICARE

## 2019-08-12 PROCEDURE — 97112 NEUROMUSCULAR REEDUCATION: CPT | Mod: GP

## 2019-08-12 PROCEDURE — 97110 THERAPEUTIC EXERCISES: CPT | Mod: GP

## 2019-08-16 ENCOUNTER — HOSPITAL ENCOUNTER (OUTPATIENT)
Dept: PHYSICAL THERAPY | Facility: CLINIC | Age: 84
Setting detail: THERAPIES SERIES
End: 2019-08-16
Attending: ORTHOPAEDIC SURGERY
Payer: MEDICARE

## 2019-08-16 PROCEDURE — 97110 THERAPEUTIC EXERCISES: CPT | Mod: GP,KX

## 2019-08-19 ENCOUNTER — HOSPITAL ENCOUNTER (OUTPATIENT)
Dept: PHYSICAL THERAPY | Facility: CLINIC | Age: 84
Setting detail: THERAPIES SERIES
End: 2019-08-19
Attending: ORTHOPAEDIC SURGERY
Payer: MEDICARE

## 2019-08-19 PROCEDURE — 97110 THERAPEUTIC EXERCISES: CPT | Mod: GP,KX

## 2019-08-23 ENCOUNTER — HOSPITAL ENCOUNTER (OUTPATIENT)
Dept: PHYSICAL THERAPY | Facility: CLINIC | Age: 84
Setting detail: THERAPIES SERIES
End: 2019-08-23
Attending: ORTHOPAEDIC SURGERY
Payer: MEDICARE

## 2019-08-23 PROCEDURE — 97112 NEUROMUSCULAR REEDUCATION: CPT | Mod: GP,KX

## 2019-08-23 PROCEDURE — 97110 THERAPEUTIC EXERCISES: CPT | Mod: GP,KX

## 2019-08-26 ENCOUNTER — HOSPITAL ENCOUNTER (OUTPATIENT)
Dept: PHYSICAL THERAPY | Facility: CLINIC | Age: 84
Setting detail: THERAPIES SERIES
End: 2019-08-26
Attending: ORTHOPAEDIC SURGERY
Payer: MEDICARE

## 2019-08-26 PROCEDURE — 97110 THERAPEUTIC EXERCISES: CPT | Mod: GP,KX

## 2019-08-26 PROCEDURE — 97112 NEUROMUSCULAR REEDUCATION: CPT | Mod: GP,KX

## 2019-08-26 NOTE — PROGRESS NOTES
Outpatient Physical Therapy Progress Note     Patient: Uriah Calloway  : 1934    Beginning/End Dates of Reporting Period:  2019 to 2019     Referring Provider: Dr. Aline Luo     Therapy Diagnosis: Decreased bilateral shoulder ROM and UE strength, Increased pain with all L shoulder motions     Client Self Report: Patient reports shoulder and back are about the same today. Continues to have difficulties with ADLs d/t shoulder, back, and hip pain.     Objective Measurements:  Objective Measure: Shoulder Pain  Details: 8/10  Objective Measure: Low Back Pain  Details: 7/10  Objective Measure: SPADI  Details: 85.45  Objective Measure: AROM  Details: L shoulder flexion 90 deg, ER at 51 deg, IR L ischial tuberosity   Objective Measure: Shoulder strength  Details: IR 4+/5, ER 4/5, flexion & abduction 4-/5 with pain,  strength: equal          Outcome Measures (most recent score):  SPADI  85.45    Goals:  Goal Identifier SPADI   Goal Description Patient will improve SPADI score by at least 20 points in order to demonstrate functional improvements.   Target Date 19   Date Met      Progress: Improved about 10 points since initial evaluation.      Goal Identifier Strength   Goal Description Patient will exhibit manual muscle testing of 4/5 or better throughout L shoulder to demonstrate improved strength needed for carrying small objects and pushing doors.   Target Date 19   Date Met      Progress:  Patient with 4-/5 shoulder flexion & abduction, improvements of ER & IR noted today     Goal Identifier ROM   Goal Description Patient will exhibit L shoulder ROM equal to R to demonstrate improved mobility needed for daily activities like reaching behind his back to tuck his shirt in or putting his seatbelt on.   Target Date 19   Date Met      Progress: AROM continues to improve, however still limited d/t pain           Progress Toward Goals:   Progress this reporting period: Patient  continues to report high levels of shoulder and low back pain, however he does exhibit improvements with  Shoulder strength and ROM as well as functional strength at this date. Ronnie is now able to lift light objects and reach into back pocket with L UE. Patient continues to respond well to increase in intensity with exercises. Reports performing HEP each day of the week which helps to decrease pain. Patient and PT in agreement that patient is making progress, although slow, and would benefit from continued skilled physical therapy intervention.           Plan:  Continue therapy per current plan of care.    Discharge:  No        Thank you for your referral.     Orly Angela, PT, DPT    [facility]    O: [phone]  E: [email or web address]

## 2019-08-30 ENCOUNTER — HOSPITAL ENCOUNTER (OUTPATIENT)
Dept: PHYSICAL THERAPY | Facility: CLINIC | Age: 84
Setting detail: THERAPIES SERIES
End: 2019-08-30
Attending: ORTHOPAEDIC SURGERY
Payer: MEDICARE

## 2019-08-30 PROCEDURE — 97112 NEUROMUSCULAR REEDUCATION: CPT | Mod: GP,KX

## 2019-08-30 PROCEDURE — 97110 THERAPEUTIC EXERCISES: CPT | Mod: GP,KX

## 2019-08-30 NOTE — PROGRESS NOTES
[facility]      OUTPATIENT PHYSICAL THERAPY  PLAN OF TREATMENT FOR OUTPATIENT REHABILITATION    Patient's Last Name, First Name, M.I.                YOB: 1934  BrodieTavoUriah  W                        Provider's Name  [facility] Medical Record No.  [id number]                               Onset Date: 6/3/2018   Start of Care Date: 6/3/2019   Type:     _X_PT   ___OT   ___SLP Medical Diagnosis: Muscle weakness ; Left shoulder pain, unspecified chronicity                       PT Diagnosis: Decreased bilateral shoulder ROM and UE strength, Increased pain with all L shoulder motions      _________________________________________________________________________________  Plan of Treatment:    Frequency/Duration: 2 x per week for 8 additional weeks     Goals:  Goal Identifier SPADI   Goal Description Patient will improve SPADI score by at least 20 points in order to demonstrate functional improvements.   Target Date 07/29/19   Date Met      Progress: Improved about 10 points since initial evaluation.      Goal Identifier Strength   Goal Description Patient will exhibit manual muscle testing of 4/5 or better throughout L shoulder to demonstrate improved strength needed for carrying small objects and pushing doors.   Target Date 08/26/19   Date Met      Progress: Patient with 4-/5 shoulder flexion & abduction, improvements of ER & IR noted today     Goal Identifier ROM   Goal Description Patient will exhibit L shoulder ROM equal to R to demonstrate improved mobility needed for daily activities like reaching behind his back to tuck his shirt in or putting his seatbelt on.   Target Date 08/26/19   Date Met      Progress: AROM continues to improve, however still limited d/t pain       Progress Toward Goals:   Progress this reporting period: Patient continues to report high levels of  shoulder and low back pain, however he does exhibit improvements with  Shoulder strength and ROM as well as functional strength at this date. Ronnie is now able to lift light objects and reach into back pocket with L UE. Patient continues to respond well to increase in intensity with exercises. Reports performing HEP each day of the week which helps to decrease pain. Patient and PT in agreement that patient is making progress, although slow, and would benefit from continued skilled physical therapy intervention.            Certification date from 8/30/2019 to 10/30/2019.    Orly Angela, PT, DPT         I CERTIFY THE NEED FOR THESE SERVICES FURNISHED UNDER        THIS PLAN OF TREATMENT AND WHILE UNDER MY CARE .             Physician Signature               Date    X_____________________________________________________                      Referring Provider:  Dr. Aline Luo

## 2019-09-06 ENCOUNTER — HOSPITAL ENCOUNTER (OUTPATIENT)
Dept: PHYSICAL THERAPY | Facility: CLINIC | Age: 84
Setting detail: THERAPIES SERIES
End: 2019-09-06
Attending: ORTHOPAEDIC SURGERY
Payer: MEDICARE

## 2019-09-06 PROCEDURE — 97112 NEUROMUSCULAR REEDUCATION: CPT | Mod: GP,KX

## 2019-09-06 PROCEDURE — 97110 THERAPEUTIC EXERCISES: CPT | Mod: GP,KX

## 2019-09-09 ENCOUNTER — HOSPITAL ENCOUNTER (OUTPATIENT)
Dept: PHYSICAL THERAPY | Facility: CLINIC | Age: 84
Setting detail: THERAPIES SERIES
End: 2019-09-09
Attending: ORTHOPAEDIC SURGERY
Payer: MEDICARE

## 2019-09-09 PROCEDURE — 97110 THERAPEUTIC EXERCISES: CPT | Mod: GP,KX

## 2019-09-09 PROCEDURE — 97112 NEUROMUSCULAR REEDUCATION: CPT | Mod: GP,KX

## 2019-09-13 ENCOUNTER — HOSPITAL ENCOUNTER (OUTPATIENT)
Dept: PHYSICAL THERAPY | Facility: CLINIC | Age: 84
Setting detail: THERAPIES SERIES
End: 2019-09-13
Attending: ORTHOPAEDIC SURGERY
Payer: MEDICARE

## 2019-09-13 PROCEDURE — 97110 THERAPEUTIC EXERCISES: CPT | Mod: GP,KX

## 2019-09-16 ENCOUNTER — HOSPITAL ENCOUNTER (OUTPATIENT)
Dept: PHYSICAL THERAPY | Facility: CLINIC | Age: 84
Setting detail: THERAPIES SERIES
End: 2019-09-16
Attending: ORTHOPAEDIC SURGERY
Payer: MEDICARE

## 2019-09-16 PROCEDURE — 97110 THERAPEUTIC EXERCISES: CPT | Mod: GP,KX

## 2019-09-16 PROCEDURE — 97112 NEUROMUSCULAR REEDUCATION: CPT | Mod: GP,KX

## 2019-09-20 ENCOUNTER — HOSPITAL ENCOUNTER (OUTPATIENT)
Dept: PHYSICAL THERAPY | Facility: CLINIC | Age: 84
Setting detail: THERAPIES SERIES
End: 2019-09-20
Attending: ORTHOPAEDIC SURGERY
Payer: MEDICARE

## 2019-09-20 PROCEDURE — 97112 NEUROMUSCULAR REEDUCATION: CPT | Mod: GP,KX

## 2019-09-20 PROCEDURE — 97110 THERAPEUTIC EXERCISES: CPT | Mod: GP,KX

## 2019-09-23 ENCOUNTER — HOSPITAL ENCOUNTER (OUTPATIENT)
Dept: PHYSICAL THERAPY | Facility: CLINIC | Age: 84
Setting detail: THERAPIES SERIES
End: 2019-09-23
Attending: ORTHOPAEDIC SURGERY
Payer: MEDICARE

## 2019-09-23 PROCEDURE — 97116 GAIT TRAINING THERAPY: CPT | Mod: GP,KX

## 2019-09-23 PROCEDURE — 97112 NEUROMUSCULAR REEDUCATION: CPT | Mod: GP,KX

## 2019-09-23 PROCEDURE — 97110 THERAPEUTIC EXERCISES: CPT | Mod: GP,KX

## 2019-09-26 ENCOUNTER — HOSPITAL ENCOUNTER (OUTPATIENT)
Dept: PHYSICAL THERAPY | Facility: CLINIC | Age: 84
Setting detail: THERAPIES SERIES
End: 2019-09-26
Attending: ORTHOPAEDIC SURGERY
Payer: MEDICARE

## 2019-09-26 PROCEDURE — 97110 THERAPEUTIC EXERCISES: CPT | Mod: GP,KX

## 2019-09-26 PROCEDURE — 97112 NEUROMUSCULAR REEDUCATION: CPT | Mod: GP,KX

## 2019-09-30 ENCOUNTER — HOSPITAL ENCOUNTER (OUTPATIENT)
Dept: PHYSICAL THERAPY | Facility: CLINIC | Age: 84
Setting detail: THERAPIES SERIES
End: 2019-09-30
Attending: ORTHOPAEDIC SURGERY
Payer: MEDICARE

## 2019-09-30 PROCEDURE — 97110 THERAPEUTIC EXERCISES: CPT | Mod: GP,KX

## 2019-09-30 PROCEDURE — 97112 NEUROMUSCULAR REEDUCATION: CPT | Mod: GP,KX

## 2019-10-04 ENCOUNTER — HOSPITAL ENCOUNTER (OUTPATIENT)
Dept: PHYSICAL THERAPY | Facility: CLINIC | Age: 84
Setting detail: THERAPIES SERIES
End: 2019-10-04
Attending: ORTHOPAEDIC SURGERY
Payer: MEDICARE

## 2019-10-04 PROCEDURE — 97110 THERAPEUTIC EXERCISES: CPT | Mod: GP,KX

## 2019-10-07 ENCOUNTER — HOSPITAL ENCOUNTER (OUTPATIENT)
Dept: PHYSICAL THERAPY | Facility: CLINIC | Age: 84
Setting detail: THERAPIES SERIES
End: 2019-10-07
Attending: ORTHOPAEDIC SURGERY
Payer: MEDICARE

## 2019-10-07 PROCEDURE — 97110 THERAPEUTIC EXERCISES: CPT | Mod: GP,KX

## 2019-10-07 NOTE — PROGRESS NOTES
Outpatient Physical Therapy Progress Note     Patient: Uriah Calloway  : 1934    Beginning/End Dates of Reporting Period:  2019  to 10/7/2019    Referring Provider: Dr. Aline Luo     Therapy Diagnosis: Decreased bilateral shoulder ROM and UE strength, Increased pain with all L shoulder motions     Client Self Report: Patient reports continued L hip soreness which is limiting him from much walking at home. L shoulder overall feels pretty good today, however increased R shoulder pain. Has appt on Wednesday for hip xray.     Objective Measurements:  Objective Measure: Shoulder Pain  Details: 3-4/10 L, 9/10 R  Objective Measure: Hip Pain  Details: 9/10 L hip  Objective Measure: ROM  Details: R shoulder: 65 AROM, 70 PROM; L shoulder: 80 AROM, 90 PROM         Outcome Measures (most recent score):  SPADI:  77.27    Goals:  Goal Identifier SPADI   Goal Description Patient will improve SPADI score by at least 20 points in order to demonstrate functional improvements.   Target Date 10/30/19   Date Met      Progress: patient has improved by 17.28 points since initial evaluation.      Goal Identifier Strength   Goal Description Patient will exhibit manual muscle testing of 4/5 or better throughout L shoulder to demonstrate improved strength needed for carrying small objects and pushing doors.   Target Date 10/30/19   Date Met  10/7/2019    Progress: goal met today.     Goal Identifier ROM   Goal Description Patient will exhibit L shoulder ROM equal to R to demonstrate improved mobility needed for daily activities like reaching behind his back to tuck his shirt in or putting his seatbelt on.   Target Date 10/30/19   Date Met      Progress: Shoulder flexion AROM at 80 degrees prior to increase in pain.          Progress Toward Goals:   Progress this reporting period: Ronnie has completed 30 physical therapy visits for treatment of L shoulder pain and generalized LE weakness. Ronnie continues to improve with L  shoulder ROM, strength, and pain, however he has sustained 3 falls at home within past 2 months which have caused slight set back in progress with low back & hip pain and LE strength. Plan to discharge patient from  shoulder treatment at end of month per plan of care.         Plan:  Continue therapy per current plan of care.    Discharge:  No    Thank you for your referral.     Orly Angela PT, DPT    [facility]    O: [phone]  E: [email or web address]

## 2019-10-10 ENCOUNTER — HOSPITAL ENCOUNTER (OUTPATIENT)
Dept: PHYSICAL THERAPY | Facility: CLINIC | Age: 84
Setting detail: THERAPIES SERIES
End: 2019-10-10
Attending: ORTHOPAEDIC SURGERY
Payer: MEDICARE

## 2019-10-10 PROCEDURE — 97110 THERAPEUTIC EXERCISES: CPT | Mod: GP,KX

## 2019-10-14 ENCOUNTER — HOSPITAL ENCOUNTER (OUTPATIENT)
Dept: PHYSICAL THERAPY | Facility: CLINIC | Age: 84
Setting detail: THERAPIES SERIES
End: 2019-10-14
Attending: ORTHOPAEDIC SURGERY
Payer: MEDICARE

## 2019-10-14 PROCEDURE — 97110 THERAPEUTIC EXERCISES: CPT | Mod: GP,KX

## 2019-10-14 PROCEDURE — 97112 NEUROMUSCULAR REEDUCATION: CPT | Mod: GP,KX

## 2019-10-21 ENCOUNTER — HOSPITAL ENCOUNTER (OUTPATIENT)
Dept: PHYSICAL THERAPY | Facility: CLINIC | Age: 84
Setting detail: THERAPIES SERIES
End: 2019-10-21
Attending: ORTHOPAEDIC SURGERY
Payer: MEDICARE

## 2019-10-21 PROCEDURE — 97110 THERAPEUTIC EXERCISES: CPT | Mod: GP,KX

## 2019-10-25 ENCOUNTER — HOSPITAL ENCOUNTER (OUTPATIENT)
Dept: PHYSICAL THERAPY | Facility: CLINIC | Age: 84
Setting detail: THERAPIES SERIES
End: 2019-10-25
Attending: ORTHOPAEDIC SURGERY
Payer: MEDICARE

## 2019-10-25 PROCEDURE — 97110 THERAPEUTIC EXERCISES: CPT | Mod: GP,KX

## 2019-10-29 ENCOUNTER — HOSPITAL ENCOUNTER (OUTPATIENT)
Dept: PHYSICAL THERAPY | Facility: CLINIC | Age: 84
Setting detail: THERAPIES SERIES
End: 2019-10-29
Attending: ORTHOPAEDIC SURGERY
Payer: MEDICARE

## 2019-10-29 PROCEDURE — 97110 THERAPEUTIC EXERCISES: CPT | Mod: GP,KX

## 2019-11-15 NOTE — PROGRESS NOTES
Outpatient Physical Therapy Discharge Note     Patient: Uriah Calloway  : 1934    Beginning/End Dates of Reporting Period:  10/7/2019 to 10/29/2019    Referring Provider: Dr. Aline Luo     Therapy Diagnosis: Decreased bilateral shoulder ROM and UE strength, Increased pain with all L shoulder motions     Client Self Report: Patient reports to PT with low back soreness, no more than usual. L shoulder continues to increase in pain with overhead activities, however he feels motion has improved    Objective Measurements:  Objective Measure: Pain  Details: 5/10 L, 2/10 R shoulder; Hip: 7/10    Objective Measure: TUG  Details: 28 sec, 2WW    Objective Measure: ROM  Details: AROM shoulder flexion: 122 deg R,  120 deg L    Objective Measure: SPADI  Details: 69    Objective Measure: Shoulder strength  Details: Bilateral: IR 4+/5, ER 4/5, flexion & abduction 4-/5 (no pain throughout)    Objective Measure: Sit<>stand x 5 reps  Details: 22 sec, no UE use      Outcome Measures (most recent score):  SPADI (See above)    Goals:  Goal Identifier SPADI   Goal Description Patient will improve SPADI score by at least 20 points in order to demonstrate functional improvements.   Target Date 10/30/19   Date Met  10/29/19   Progress:     Goal Identifier Strength   Goal Description Patient will exhibit manual muscle testing of 4/5 or better throughout L shoulder to demonstrate improved strength needed for carrying small objects and pushing doors.   Target Date 10/30/19   Date Met  10/07/19   Progress:     Goal Identifier ROM   Goal Description Patient will exhibit L shoulder ROM equal to R to demonstrate improved mobility needed for daily activities like reaching behind his back to tuck his shirt in or putting his seatbelt on.   Target Date 10/30/19   Date Met  10/29/19   Progress:         Progress Toward Goals:   Progress this reporting period: Ronnie has completed 36 physical therapy visits for treatment of L shoulder pain and  generalized LE weakness. He exhibits significant improvements in strength and mobility compared to initial evaluation. Patient continues to report shoulder pain, however he is able to perform symmetrical shoulder ROM upon assessment today. Patient educated on importance of continuing home exercise program in order to maintain his strength and mobility. Patient to discharge with home program at this time.         Plan:  Discharge from therapy.    Discharge:    Reason for Discharge: Patient has met all goals.    Equipment Issued: [email or web address].    Discharge Plan: Patient to continue home program.      Thank you for your referral.     Orly Angela PT, DPT    [facility]    O: [phone]  E: [email or web address]

## 2020-07-14 DIAGNOSIS — Z11.59 ENCOUNTER FOR SCREENING FOR OTHER VIRAL DISEASES: Primary | ICD-10-CM

## 2020-07-31 ENCOUNTER — TRANSFERRED RECORDS (OUTPATIENT)
Dept: HEALTH INFORMATION MANAGEMENT | Facility: CLINIC | Age: 85
End: 2020-07-31

## 2020-07-31 LAB
CREAT SERPL-MCNC: 1.1 MG/DL (ref 0.5–1.5)
GFR SERPL CREATININE-BSD FRML MDRD: >60 ML/MIN
GLUCOSE SERPL-MCNC: 105 MG/DL (ref 70–100)
POTASSIUM SERPL-SCNC: 4.1 MMOL/L (ref 3.5–5)

## 2020-08-03 DIAGNOSIS — Z11.59 ENCOUNTER FOR SCREENING FOR OTHER VIRAL DISEASES: ICD-10-CM

## 2020-08-03 PROCEDURE — U0003 INFECTIOUS AGENT DETECTION BY NUCLEIC ACID (DNA OR RNA); SEVERE ACUTE RESPIRATORY SYNDROME CORONAVIRUS 2 (SARS-COV-2) (CORONAVIRUS DISEASE [COVID-19]), AMPLIFIED PROBE TECHNIQUE, MAKING USE OF HIGH THROUGHPUT TECHNOLOGIES AS DESCRIBED BY CMS-2020-01-R: HCPCS | Performed by: OPHTHALMOLOGY

## 2020-08-04 LAB
SARS-COV-2 RNA SPEC QL NAA+PROBE: NOT DETECTED
SPECIMEN SOURCE: NORMAL

## 2020-08-06 ENCOUNTER — ANESTHESIA EVENT (OUTPATIENT)
Dept: SURGERY | Facility: CLINIC | Age: 85
End: 2020-08-06
Payer: MEDICARE

## 2020-08-06 ENCOUNTER — HOSPITAL ENCOUNTER (OUTPATIENT)
Facility: CLINIC | Age: 85
Discharge: HOME | End: 2020-08-06
Attending: OPHTHALMOLOGY | Admitting: OPHTHALMOLOGY
Payer: MEDICARE

## 2020-08-06 ENCOUNTER — ANESTHESIA (OUTPATIENT)
Dept: SURGERY | Facility: CLINIC | Age: 85
End: 2020-08-06
Payer: MEDICARE

## 2020-08-06 VITALS
DIASTOLIC BLOOD PRESSURE: 63 MMHG | HEART RATE: 81 BPM | OXYGEN SATURATION: 97 % | TEMPERATURE: 97.9 F | RESPIRATION RATE: 18 BRPM | SYSTOLIC BLOOD PRESSURE: 136 MMHG

## 2020-08-06 PROCEDURE — 25000128 H RX IP 250 OP 636: Performed by: NURSE ANESTHETIST, CERTIFIED REGISTERED

## 2020-08-06 PROCEDURE — 36000025 ZZH CATARACT SURGICAL PACKAGE: Performed by: OPHTHALMOLOGY

## 2020-08-06 PROCEDURE — 37000012 ZZH ANESTHESIA CATARACT PACKAGE: Performed by: OPHTHALMOLOGY

## 2020-08-06 PROCEDURE — 25000125 ZZHC RX 250: Performed by: NURSE ANESTHETIST, CERTIFIED REGISTERED

## 2020-08-06 PROCEDURE — V2632 POST CHMBR INTRAOCULAR LENS: HCPCS | Performed by: OPHTHALMOLOGY

## 2020-08-06 PROCEDURE — 25000128 H RX IP 250 OP 636: Performed by: OPHTHALMOLOGY

## 2020-08-06 PROCEDURE — 71000022 ZZH RECOVERY CATRACT PACKAGE: Performed by: OPHTHALMOLOGY

## 2020-08-06 PROCEDURE — 25000125 ZZHC RX 250: Performed by: OPHTHALMOLOGY

## 2020-08-06 DEVICE — EYE IMP IOL AMO PCL TECNIS ZCB00 20.0: Type: IMPLANTABLE DEVICE | Site: EYE | Status: FUNCTIONAL

## 2020-08-06 RX ORDER — SODIUM CHLORIDE, SODIUM LACTATE, POTASSIUM CHLORIDE, CALCIUM CHLORIDE 600; 310; 30; 20 MG/100ML; MG/100ML; MG/100ML; MG/100ML
INJECTION, SOLUTION INTRAVENOUS CONTINUOUS
Status: DISCONTINUED | OUTPATIENT
Start: 2020-08-06 | End: 2020-08-06 | Stop reason: HOSPADM

## 2020-08-06 RX ORDER — PHENYLEPHRINE HYDROCHLORIDE 25 MG/ML
1 SOLUTION/ DROPS OPHTHALMIC
Status: DISCONTINUED | OUTPATIENT
Start: 2020-08-06 | End: 2020-08-06 | Stop reason: HOSPADM

## 2020-08-06 RX ORDER — DICLOFENAC SODIUM 1 MG/ML
1 SOLUTION/ DROPS OPHTHALMIC
Status: COMPLETED | OUTPATIENT
Start: 2020-08-06 | End: 2020-08-06

## 2020-08-06 RX ORDER — MOXIFLOXACIN 5 MG/ML
1 SOLUTION/ DROPS OPHTHALMIC
Status: DISCONTINUED | OUTPATIENT
Start: 2020-08-06 | End: 2020-08-06 | Stop reason: HOSPADM

## 2020-08-06 RX ORDER — FENTANYL CITRATE 50 UG/ML
25 INJECTION, SOLUTION INTRAMUSCULAR; INTRAVENOUS
Status: DISCONTINUED | OUTPATIENT
Start: 2020-08-06 | End: 2020-08-06 | Stop reason: HOSPADM

## 2020-08-06 RX ORDER — DICLOFENAC SODIUM 1 MG/ML
1 SOLUTION/ DROPS OPHTHALMIC
Status: DISCONTINUED | OUTPATIENT
Start: 2020-08-06 | End: 2020-08-06 | Stop reason: HOSPADM

## 2020-08-06 RX ORDER — TIMOLOL MALEATE 5 MG/ML
1 SOLUTION/ DROPS OPHTHALMIC ONCE
Status: COMPLETED | OUTPATIENT
Start: 2020-08-06 | End: 2020-08-06

## 2020-08-06 RX ORDER — FAMOTIDINE 10 MG
10 TABLET ORAL 2 TIMES DAILY
COMMUNITY

## 2020-08-06 RX ORDER — TROPICAMIDE 10 MG/ML
1 SOLUTION/ DROPS OPHTHALMIC
Status: COMPLETED | OUTPATIENT
Start: 2020-08-06 | End: 2020-08-06

## 2020-08-06 RX ORDER — PROPARACAINE HYDROCHLORIDE 5 MG/ML
1 SOLUTION/ DROPS OPHTHALMIC ONCE
Status: COMPLETED | OUTPATIENT
Start: 2020-08-06 | End: 2020-08-06

## 2020-08-06 RX ORDER — ONDANSETRON 2 MG/ML
4 INJECTION INTRAMUSCULAR; INTRAVENOUS EVERY 30 MIN PRN
Status: DISCONTINUED | OUTPATIENT
Start: 2020-08-06 | End: 2020-08-06 | Stop reason: HOSPADM

## 2020-08-06 RX ORDER — NALOXONE HYDROCHLORIDE 0.4 MG/ML
.1-.4 INJECTION, SOLUTION INTRAMUSCULAR; INTRAVENOUS; SUBCUTANEOUS
Status: DISCONTINUED | OUTPATIENT
Start: 2020-08-06 | End: 2020-08-06 | Stop reason: HOSPADM

## 2020-08-06 RX ORDER — CYCLOPENTOLATE HYDROCHLORIDE 10 MG/ML
1 SOLUTION/ DROPS OPHTHALMIC
Status: COMPLETED | OUTPATIENT
Start: 2020-08-06 | End: 2020-08-06

## 2020-08-06 RX ORDER — MOXIFLOXACIN 5 MG/ML
1 SOLUTION/ DROPS OPHTHALMIC
Status: COMPLETED | OUTPATIENT
Start: 2020-08-06 | End: 2020-08-06

## 2020-08-06 RX ORDER — FENTANYL CITRATE 50 UG/ML
INJECTION, SOLUTION INTRAMUSCULAR; INTRAVENOUS PRN
Status: DISCONTINUED | OUTPATIENT
Start: 2020-08-06 | End: 2020-08-06

## 2020-08-06 RX ORDER — LIDOCAINE 40 MG/G
CREAM TOPICAL
Status: DISCONTINUED | OUTPATIENT
Start: 2020-08-06 | End: 2020-08-06 | Stop reason: HOSPADM

## 2020-08-06 RX ORDER — PROPARACAINE HYDROCHLORIDE 5 MG/ML
1 SOLUTION/ DROPS OPHTHALMIC ONCE
Status: DISCONTINUED | OUTPATIENT
Start: 2020-08-06 | End: 2020-08-06 | Stop reason: HOSPADM

## 2020-08-06 RX ORDER — ONDANSETRON 4 MG/1
4 TABLET, ORALLY DISINTEGRATING ORAL EVERY 30 MIN PRN
Status: DISCONTINUED | OUTPATIENT
Start: 2020-08-06 | End: 2020-08-06 | Stop reason: HOSPADM

## 2020-08-06 RX ORDER — TROPICAMIDE 10 MG/ML
1 SOLUTION/ DROPS OPHTHALMIC
Status: DISCONTINUED | OUTPATIENT
Start: 2020-08-06 | End: 2020-08-06 | Stop reason: HOSPADM

## 2020-08-06 RX ADMIN — FENTANYL CITRATE 25 MCG: 50 INJECTION, SOLUTION INTRAMUSCULAR; INTRAVENOUS at 08:31

## 2020-08-06 RX ADMIN — CYCLOPENTOLATE HYDROCHLORIDE 1 DROP: 10 SOLUTION/ DROPS OPHTHALMIC at 07:23

## 2020-08-06 RX ADMIN — PROPARACAINE HYDROCHLORIDE 1 DROP: 5 SOLUTION/ DROPS OPHTHALMIC at 07:18

## 2020-08-06 RX ADMIN — LIDOCAINE HYDROCHLORIDE 1 ML: 10 INJECTION, SOLUTION EPIDURAL; INFILTRATION; INTRACAUDAL; PERINEURAL at 07:43

## 2020-08-06 RX ADMIN — DICLOFENAC SODIUM 1 DROP: 1 SOLUTION OPHTHALMIC at 07:31

## 2020-08-06 RX ADMIN — CYCLOPENTOLATE HYDROCHLORIDE 1 DROP: 10 SOLUTION/ DROPS OPHTHALMIC at 07:32

## 2020-08-06 RX ADMIN — MOXIFLOXACIN 1 DROP: 5 SOLUTION/ DROPS OPHTHALMIC at 07:42

## 2020-08-06 RX ADMIN — TROPICAMIDE 1 DROP: 10 SOLUTION/ DROPS OPHTHALMIC at 07:44

## 2020-08-06 RX ADMIN — TIMOLOL MALEATE 1 DROP: 5 SOLUTION/ DROPS OPHTHALMIC at 09:35

## 2020-08-06 RX ADMIN — TROPICAMIDE 1 DROP: 10 SOLUTION/ DROPS OPHTHALMIC at 07:21

## 2020-08-06 RX ADMIN — TROPICAMIDE 1 DROP: 10 SOLUTION/ DROPS OPHTHALMIC at 07:32

## 2020-08-06 RX ADMIN — DICLOFENAC SODIUM 1 DROP: 1 SOLUTION OPHTHALMIC at 07:43

## 2020-08-06 RX ADMIN — DICLOFENAC SODIUM 1 DROP: 1 SOLUTION OPHTHALMIC at 07:20

## 2020-08-06 RX ADMIN — MOXIFLOXACIN 1 DROP: 5 SOLUTION/ DROPS OPHTHALMIC at 07:19

## 2020-08-06 RX ADMIN — CYCLOPENTOLATE HYDROCHLORIDE 1 DROP: 10 SOLUTION/ DROPS OPHTHALMIC at 07:45

## 2020-08-06 RX ADMIN — MOXIFLOXACIN 1 DROP: 5 SOLUTION/ DROPS OPHTHALMIC at 07:30

## 2020-08-06 SDOH — HEALTH STABILITY: MENTAL HEALTH: CURRENT SMOKER: 0

## 2020-08-06 ASSESSMENT — COPD QUESTIONNAIRES
CAT_SEVERITY: MILD
COPD: 1

## 2020-08-06 ASSESSMENT — LIFESTYLE VARIABLES: TOBACCO_USE: 1

## 2020-08-06 NOTE — OP NOTE
[facility]  Ophthalmology Operative Note    PREOPERATIVE DIAGNOSIS: Complex Cataract, Right eye.   POSTOPERATIVE DIAGNOSIS: Complex Cataract, Right eye.   OPERATION: Cataract extraction with placement of posterior chamber intraocular lens in the Right eye.   ANESTHESIA: Topical   INDICATIONS FOR PROCEDURE: Uriah Calloway was seen in the [facility] for decreased visual acuity in the Right eye. The patient was found to have a cataract in the Right eye. The risks, benefits, alternatives and goals of cataract extraction were discussed with the patient, and after adequate discussion the patient understood and agreed to these, and a signed informed consent was obtained prior to the procedure.   DESCRIPTION OF PROCEDURE: After proper patient identification, topical anesthesia was applied to the Right eye. The patient was brought to the operating room and the Right eye was prepped and draped in the usual manner for intraocular surgery. A lid speculum was placed in the Right eye. A paracentesis was then created and the anterior chamber was filled with 1% non-preserved lidocaine followed by Viscoat. A clear corneal incision was then created, tunneling 2 mm into the cornea before entering the anterior chamber. 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   A continuous curvilinear capsulorrhexis was then created using a cystotome and Utrata forceps. Hydrodissection was carried out with BSS on a blunt-tip cannula and the lens rotated freely within the capsular bag. Phacoemulsification was then carried out using the stop and chop technique. Residual cortical material was removed using the I&A handpiece. The capsular bag was then filled with Provisc and an injectable lens was injected into the capsular bag. The lens showed good centration and stability. The Malyugin ring was removed.  Residual viscoelastic was removed using the I&A handpiece. The wound was then hydrated and the  anterior chamber reformed through the paracentesis. The wound was checked and found to be sealed. Topical drops of Vigamox and a Econopred were placed in the patient's Right eye followed by a Murrell shield over the top of this. The patient tolerated the procedure well without complications and was told to follow up in the clinic in the next postoperative day.     Implant Name Type Inv. Item Serial No.  Lot No. LRB No. Used Action   EYE IMP IOL MANDY PCL TECNIS [id number] 20.0 Lens/Eye Implant EYE IMP IOL MANDY PCL TECNIS [id number] 20.0 [id number] [employer]  Right 1 Implanted        DAYTON HERRERA MD
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
## 2020-08-06 NOTE — DISCHARGE INSTRUCTIONS
POST CATARACT SURGERY EYE INSTRUCTIONS  [facility]           Eye Medications    If you opted for the one bottle containing all 3 eye medications, follow these instructions.    *Instill one drop into the operative eye 3 times a day until the bottle is empty.       - If your are currently being treated for glaucoma please continue your medication as normally prescribed.     - Wear eye shield while sleeping for 3 days     - Refrain from heavy lifting, bending, straining, or strenuous activity for 1      week.     - Do not rub or push on the operative eye for 1 week (you may wipe the eye lid(s) gently with a wet wash cloth to remove matter from eyelashes).     - You may bathe or shower, but do not get the eye wet for 1 month      (swimming, hot tubs or other water activities).     - Minor itching, scratching sensation, burning sensation, etc. is normal.     Report any severe eye pain or loss of vision.     - Please call our office at [phone] if you have questions or     concerns.

## 2020-08-06 NOTE — ANESTHESIA CARE TRANSFER NOTE
Patient: Uriah Calloway    Procedure(s):  cataract removal and placement of new lens    Diagnosis: Nuclear sclerotic cataract of right eye [H25.11]  Diagnosis Additional Information: No value filed.    Anesthesia Type:   MAC     Note:  Airway :Room Air  Patient transferred to:Phase II  Handoff Report: Identifed the Patient, Identified the Reponsible Provider, Reviewed the pertinent medical history, Discussed the surgical course, Reviewed Intra-OP anesthesia mangement and issues during anesthesia, Set expectations for post-procedure period and Allowed opportunity for questions and acknowledgement of understanding      Vitals: (Last set prior to Anesthesia Care Transfer)    CRNA VITALS  8/6/2020 0823 - 8/6/2020 0923      8/6/2020             Pulse:  81    SpO2:  100 %    Resp Rate (observed):  (!) 5                Electronically Signed By: LUZ Larsen CRNA  August 6, 2020  9:38 AM

## 2020-08-17 DIAGNOSIS — Z11.59 ENCOUNTER FOR SCREENING FOR OTHER VIRAL DISEASES: ICD-10-CM

## 2020-08-17 PROCEDURE — U0003 INFECTIOUS AGENT DETECTION BY NUCLEIC ACID (DNA OR RNA); SEVERE ACUTE RESPIRATORY SYNDROME CORONAVIRUS 2 (SARS-COV-2) (CORONAVIRUS DISEASE [COVID-19]), AMPLIFIED PROBE TECHNIQUE, MAKING USE OF HIGH THROUGHPUT TECHNOLOGIES AS DESCRIBED BY CMS-2020-01-R: HCPCS | Performed by: OPHTHALMOLOGY

## 2020-08-18 LAB
SARS-COV-2 RNA SPEC QL NAA+PROBE: NOT DETECTED
SPECIMEN SOURCE: NORMAL

## 2020-08-19 ENCOUNTER — ANESTHESIA EVENT (OUTPATIENT)
Dept: SURGERY | Facility: CLINIC | Age: 85
End: 2020-08-19
Payer: MEDICARE

## 2020-08-19 SDOH — HEALTH STABILITY: MENTAL HEALTH: CURRENT SMOKER: 0

## 2020-08-19 ASSESSMENT — LIFESTYLE VARIABLES: TOBACCO_USE: 1

## 2020-08-19 ASSESSMENT — COPD QUESTIONNAIRES
COPD: 1
CAT_SEVERITY: MILD
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
## 2020-08-20 ENCOUNTER — HOSPITAL ENCOUNTER (OUTPATIENT)
Facility: CLINIC | Age: 85
Discharge: HOME | End: 2020-08-20
Attending: OPHTHALMOLOGY | Admitting: OPHTHALMOLOGY
Payer: MEDICARE

## 2020-08-20 ENCOUNTER — ANESTHESIA (OUTPATIENT)
Dept: SURGERY | Facility: CLINIC | Age: 85
End: 2020-08-20
Payer: MEDICARE

## 2020-08-20 VITALS
HEART RATE: 78 BPM | SYSTOLIC BLOOD PRESSURE: 102 MMHG | OXYGEN SATURATION: 97 % | RESPIRATION RATE: 20 BRPM | DIASTOLIC BLOOD PRESSURE: 61 MMHG | TEMPERATURE: 97.8 F

## 2020-08-20 PROCEDURE — 36000025 ZZH CATARACT SURGICAL PACKAGE: Performed by: OPHTHALMOLOGY

## 2020-08-20 PROCEDURE — 27210794 ZZH OR GENERAL SUPPLY STERILE: Performed by: OPHTHALMOLOGY

## 2020-08-20 PROCEDURE — 71000022 ZZH RECOVERY CATRACT PACKAGE: Performed by: OPHTHALMOLOGY

## 2020-08-20 PROCEDURE — 25000128 H RX IP 250 OP 636: Performed by: NURSE ANESTHETIST, CERTIFIED REGISTERED

## 2020-08-20 PROCEDURE — 25000125 ZZHC RX 250: Performed by: OPHTHALMOLOGY

## 2020-08-20 PROCEDURE — V2632 POST CHMBR INTRAOCULAR LENS: HCPCS | Performed by: OPHTHALMOLOGY

## 2020-08-20 PROCEDURE — 25000128 H RX IP 250 OP 636: Performed by: OPHTHALMOLOGY

## 2020-08-20 PROCEDURE — 37000012 ZZH ANESTHESIA CATARACT PACKAGE: Performed by: OPHTHALMOLOGY

## 2020-08-20 PROCEDURE — 25000125 ZZHC RX 250: Performed by: NURSE ANESTHETIST, CERTIFIED REGISTERED

## 2020-08-20 DEVICE — EYE IMP IOL AMO PCL TECNIS ZCB00 19.0: Type: IMPLANTABLE DEVICE | Site: EYE | Status: FUNCTIONAL

## 2020-08-20 RX ORDER — PROPARACAINE HYDROCHLORIDE 5 MG/ML
1 SOLUTION/ DROPS OPHTHALMIC ONCE
Status: COMPLETED | OUTPATIENT
Start: 2020-08-20 | End: 2020-08-20

## 2020-08-20 RX ORDER — LIDOCAINE 40 MG/G
CREAM TOPICAL
Status: DISCONTINUED | OUTPATIENT
Start: 2020-08-20 | End: 2020-08-20 | Stop reason: HOSPADM

## 2020-08-20 RX ORDER — PREDNISOLONE ACETATE 10 MG/ML
SUSPENSION/ DROPS OPHTHALMIC PRN
Status: DISCONTINUED | OUTPATIENT
Start: 2020-08-20 | End: 2020-08-20 | Stop reason: HOSPADM

## 2020-08-20 RX ORDER — MOXIFLOXACIN 5 MG/ML
1 SOLUTION/ DROPS OPHTHALMIC
Status: COMPLETED | OUTPATIENT
Start: 2020-08-20 | End: 2020-08-20

## 2020-08-20 RX ORDER — DICLOFENAC SODIUM 1 MG/ML
1 SOLUTION/ DROPS OPHTHALMIC
Status: ACTIVE | OUTPATIENT
Start: 2020-08-20 | End: 2020-08-20

## 2020-08-20 RX ORDER — SODIUM CHLORIDE, SODIUM LACTATE, POTASSIUM CHLORIDE, CALCIUM CHLORIDE 600; 310; 30; 20 MG/100ML; MG/100ML; MG/100ML; MG/100ML
INJECTION, SOLUTION INTRAVENOUS CONTINUOUS
Status: CANCELLED | OUTPATIENT
Start: 2020-08-20

## 2020-08-20 RX ORDER — PHENYLEPHRINE HYDROCHLORIDE 25 MG/ML
1 SOLUTION/ DROPS OPHTHALMIC
Status: COMPLETED | OUTPATIENT
Start: 2020-08-20 | End: 2020-08-20

## 2020-08-20 RX ORDER — PROPARACAINE HYDROCHLORIDE 5 MG/ML
1 SOLUTION/ DROPS OPHTHALMIC ONCE
Status: DISCONTINUED | OUTPATIENT
Start: 2020-08-20 | End: 2020-08-20 | Stop reason: HOSPADM

## 2020-08-20 RX ORDER — TROPICAMIDE 10 MG/ML
1 SOLUTION/ DROPS OPHTHALMIC
Status: COMPLETED | OUTPATIENT
Start: 2020-08-20 | End: 2020-08-20

## 2020-08-20 RX ADMIN — PHENYLEPHRINE HYDROCHLORIDE 1 DROP: 25 SOLUTION/ DROPS OPHTHALMIC at 08:02

## 2020-08-20 RX ADMIN — TROPICAMIDE 1 DROP: 10 SOLUTION/ DROPS OPHTHALMIC at 08:08

## 2020-08-20 RX ADMIN — MIDAZOLAM 1 MG: 1 INJECTION INTRAMUSCULAR; INTRAVENOUS at 08:51

## 2020-08-20 RX ADMIN — MOXIFLOXACIN 1 DROP: 5 SOLUTION/ DROPS OPHTHALMIC at 08:06

## 2020-08-20 RX ADMIN — TROPICAMIDE 1 DROP: 10 SOLUTION/ DROPS OPHTHALMIC at 07:58

## 2020-08-20 RX ADMIN — PHENYLEPHRINE HYDROCHLORIDE 1 DROP: 25 SOLUTION/ DROPS OPHTHALMIC at 08:07

## 2020-08-20 RX ADMIN — MOXIFLOXACIN 1 DROP: 5 SOLUTION/ DROPS OPHTHALMIC at 08:00

## 2020-08-20 RX ADMIN — MIDAZOLAM 1 MG: 1 INJECTION INTRAMUSCULAR; INTRAVENOUS at 08:53

## 2020-08-20 RX ADMIN — LIDOCAINE HYDROCHLORIDE 0.1 ML: 10 INJECTION, SOLUTION EPIDURAL; INFILTRATION; INTRACAUDAL; PERINEURAL at 08:23

## 2020-08-20 RX ADMIN — TROPICAMIDE 1 DROP: 10 SOLUTION/ DROPS OPHTHALMIC at 08:03

## 2020-08-20 RX ADMIN — PROPARACAINE HYDROCHLORIDE 1 DROP: 5 SOLUTION/ DROPS OPHTHALMIC at 07:53

## 2020-08-20 RX ADMIN — PHENYLEPHRINE HYDROCHLORIDE 1 DROP: 25 SOLUTION/ DROPS OPHTHALMIC at 07:57

## 2020-08-20 RX ADMIN — MOXIFLOXACIN 1 DROP: 5 SOLUTION/ DROPS OPHTHALMIC at 07:55

## 2020-08-20 NOTE — ANESTHESIA CARE TRANSFER NOTE
Patient: Uriah Calloway    Procedure(s):  Cataract extraction with intraocular lens implant    Diagnosis: Nuclear sclerotic cataract of left eye [H25.12]  Diagnosis Additional Information: No value filed.    Anesthesia Type:   MAC     Note:  Airway :Room Air  Patient transferred to:Phase II  Handoff Report: Identifed the Patient, Identified the Reponsible Provider, Reviewed the pertinent medical history, Discussed the surgical course, Reviewed Intra-OP anesthesia mangement and issues during anesthesia, Set expectations for post-procedure period and Allowed opportunity for questions and acknowledgement of understanding      Vitals: (Last set prior to Anesthesia Care Transfer)    CRNA VITALS  8/20/2020 0846 - 8/20/2020 0919      8/20/2020             Pulse:  78    SpO2:  100 %    EKG:  Sinus rhythm                Electronically Signed By: LUZ Stack CRNA  August 20, 2020  9:19 AM

## 2020-08-20 NOTE — ANESTHESIA POSTPROCEDURE EVALUATION
Patient: Uriah Calloway    Procedure(s):  Cataract extraction with intraocular lens implant    Diagnosis:Nuclear sclerotic cataract of left eye [H25.12]  Diagnosis Additional Information: No value filed.    Anesthesia Type:  MAC    Note:  Anesthesia Post Evaluation    Patient location during evaluation: Phase 2  Patient participation: Able to fully participate in evaluation  Level of consciousness: awake  Pain management: adequate  Airway patency: patent  Cardiovascular status: blood pressure returned to baseline  Respiratory status: room air and spontaneous ventilation  Hydration status: balanced  PONV: none       Comments: Patient resting without complaint. He was very pleased with his anesthesia.         Last vitals:  Vitals:    08/20/20 0919 08/20/20 0928 08/20/20 0930   BP: 122/67  113/64   Pulse: 80  80   Resp:      Temp:      SpO2: 97% 97%          Electronically Signed By: LUZ Stack CRNA  August 20, 2020  9:41 AM

## 2020-08-20 NOTE — DISCHARGE INSTRUCTIONS
POST CATARACT SURGERY EYE INSTRUCTIONS  [facility]           Eye Medications    VIGAMOX/OFLOXACIN (Tan Cap)    KETOROLAC (Matthews Cap)    PREDNISOLONE (Pink or White Cap)    If you opted for the individual bottles of eye medications follow these instructions.    *Instill one drop from each bottle into the operative eye 3 times a day until each  bottle is empty.    *Wait 5 minutes between each drop.    If you opted for the one bottle containing all 3 eye medications, follow these instructions.    *Instill one drop into the operative eye 3 times a day until the bottle is empty.       - If your are currently being treated for glaucoma please continue your    medication as normally prescribed.     - Wear eye shield while sleeping for 3 days     - Refrain from heavy lifting, bending, straining, or strenuous activity for 1      week.     - Do not rub or push on the operative eye for 1 week (you may wipe the    eye lid(s) gently with a wet wash cloth to remove matter from                         eyelashes).     - You may bathe or shower, but do not get the eye wet for 1 month      (swimming, hot tubs or other water activities).     - Minor itching, scratching sensation, burning sensation, etc. is normal.     Report any severe eye pain or loss of vision.     - Please call our office at [phone] if you have questions or     concerns.

## 2020-08-20 NOTE — OP NOTE
[facility]  Ophthalmology Operative Note    PREOPERATIVE DIAGNOSIS: 1. Cataract, Left eye. 2. Small Pupil, Left eye.  POSTOPERATIVE DIAGNOSIS: 1. Cataract, Left eye. 2. Small Pupil, Left eye.  PROCEDURE: Kelman phacoemulsification with posterior chamber lens implant, Left eye.   ANESTHESIA: Topical.   COMPLICATIONS: None.   INDICATIONS FOR PROCEDURE: Uriah Calloway is a 86 year old male who was evaluated preoperatively in the eye clinic and found to have a visually significant cataract of the Left eye which decreased his vision to the 20/50 level. This decreased vision was interfering with activities of daily living such as reading and driving. The patient was deemed a suitable candidate for cataract extraction. The risks, benefits and alternatives were explained to the patient. All questions were answered and the patient elected to proceed with cataract extraction and lens implant.   DESCRIPTION OF PROCEDURE: After informed consent was obtained, the patient was given topical lidocaine gel to the Left eye and it was prepped with Betadine and draped in the usual sterile manner. A paracentesis was made at the 12 o'clock position and the anterior chamber was inflated with Shurgarcaine followed by Endocoat. A clear corneal incision using a 2.5 mm metal keratome was made at the 3 o'clock position. Because the pupil did not dilate well, a Malyugin ring was inserted into the anterior chamber in order to expand the pupil.  A continuous tear anterior capsulorrhexis was started with the cystotome and completed with the Utrata forceps. The nucleus was hydrodissected and found to rotate freely. The nucleus was removed with the phacoemulsification handpiece and the residual cortex with the irrigation-aspiration handpiece. The capsular bag was inspected and found to be free of any tears or breaks and was inflated with Healon. A model ZCBOO 19.0 diopter posterior chamber lens was inserted into the capsular bag  without complications. It was found to center well and rotate freely. The Malyugin ring was removed from the anterior chamber.  The residual Healon was removed with the irrigation-aspiration handpiece. The anterior chamber was inflated with balanced salt solution. The eye was palpated and found to be of normal physiologic pressure. The wound was inspected and found to be free of any leaks. One drop each of Betadine, Vigamox, and Omnipred were instilled in the Left eye and a shield was placed over the eye. The patient was transferred to the postanesthesia care unit in stable condition.     Castillo Bowens M.D.

## 2021-01-17 ENCOUNTER — APPOINTMENT (OUTPATIENT)
Dept: CT IMAGING | Facility: CLINIC | Age: 86
End: 2021-01-17
Attending: FAMILY MEDICINE
Payer: COMMERCIAL

## 2021-01-17 ENCOUNTER — APPOINTMENT (OUTPATIENT)
Dept: GENERAL RADIOLOGY | Facility: CLINIC | Age: 86
End: 2021-01-17
Attending: FAMILY MEDICINE
Payer: COMMERCIAL

## 2021-01-17 ENCOUNTER — HOSPITAL ENCOUNTER (EMERGENCY)
Facility: CLINIC | Age: 86
Discharge: ANOTHER HOSPITAL | End: 2021-01-17
Attending: FAMILY MEDICINE | Admitting: FAMILY MEDICINE
Payer: COMMERCIAL

## 2021-01-17 ENCOUNTER — NURSE TRIAGE (OUTPATIENT)
Dept: NURSING | Facility: CLINIC | Age: 86
End: 2021-01-17

## 2021-01-17 VITALS
WEIGHT: 195 LBS | HEART RATE: 84 BPM | TEMPERATURE: 97.6 F | DIASTOLIC BLOOD PRESSURE: 65 MMHG | RESPIRATION RATE: 18 BRPM | OXYGEN SATURATION: 96 % | BODY MASS INDEX: 27.98 KG/M2 | SYSTOLIC BLOOD PRESSURE: 134 MMHG

## 2021-01-17 DIAGNOSIS — R29.6 FALLS FREQUENTLY: ICD-10-CM

## 2021-01-17 DIAGNOSIS — M25.552 HIP PAIN, LEFT: ICD-10-CM

## 2021-01-17 DIAGNOSIS — G89.29 CHRONIC BACK PAIN, UNSPECIFIED BACK LOCATION, UNSPECIFIED BACK PAIN LATERALITY: ICD-10-CM

## 2021-01-17 DIAGNOSIS — R29.898 LEFT LEG WEAKNESS: ICD-10-CM

## 2021-01-17 DIAGNOSIS — Z78.9 UNABLE TO CARE FOR SELF: ICD-10-CM

## 2021-01-17 DIAGNOSIS — F03.90 DEMENTIA WITHOUT BEHAVIORAL DISTURBANCE, UNSPECIFIED DEMENTIA TYPE: ICD-10-CM

## 2021-01-17 DIAGNOSIS — M54.9 CHRONIC BACK PAIN, UNSPECIFIED BACK LOCATION, UNSPECIFIED BACK PAIN LATERALITY: ICD-10-CM

## 2021-01-17 LAB
ALBUMIN SERPL-MCNC: 2.9 G/DL (ref 3.4–5)
ALP SERPL-CCNC: 88 U/L (ref 40–150)
ALT SERPL W P-5'-P-CCNC: 17 U/L (ref 0–70)
ANION GAP SERPL CALCULATED.3IONS-SCNC: 4 MMOL/L (ref 3–14)
AST SERPL W P-5'-P-CCNC: 11 U/L (ref 0–45)
BASOPHILS # BLD AUTO: 0 10E9/L (ref 0–0.2)
BASOPHILS NFR BLD AUTO: 0.4 %
BILIRUB SERPL-MCNC: 0.5 MG/DL (ref 0.2–1.3)
BUN SERPL-MCNC: 15 MG/DL (ref 7–30)
CALCIUM SERPL-MCNC: 9.6 MG/DL (ref 8.5–10.1)
CHLORIDE SERPL-SCNC: 108 MMOL/L (ref 94–109)
CO2 SERPL-SCNC: 27 MMOL/L (ref 20–32)
CREAT SERPL-MCNC: 0.99 MG/DL (ref 0.66–1.25)
DIFFERENTIAL METHOD BLD: ABNORMAL
EOSINOPHIL NFR BLD AUTO: 2.4 %
ERYTHROCYTE [DISTWIDTH] IN BLOOD BY AUTOMATED COUNT: 13.7 % (ref 10–15)
GFR SERPL CREATININE-BSD FRML MDRD: 69 ML/MIN/{1.73_M2}
GLUCOSE SERPL-MCNC: 91 MG/DL (ref 70–99)
HCT VFR BLD AUTO: 33.6 % (ref 40–53)
HGB BLD-MCNC: 11 G/DL (ref 13.3–17.7)
IMM GRANULOCYTES # BLD: 0.1 10E9/L (ref 0–0.4)
IMM GRANULOCYTES NFR BLD: 0.6 %
LABORATORY COMMENT REPORT: NORMAL
LYMPHOCYTES # BLD AUTO: 1.7 10E9/L (ref 0.8–5.3)
LYMPHOCYTES NFR BLD AUTO: 20.2 %
MCH RBC QN AUTO: 31.3 PG (ref 26.5–33)
MCHC RBC AUTO-ENTMCNC: 32.7 G/DL (ref 31.5–36.5)
MCV RBC AUTO: 96 FL (ref 78–100)
MONOCYTES # BLD AUTO: 0.8 10E9/L (ref 0–1.3)
MONOCYTES NFR BLD AUTO: 9.4 %
NEUTROPHILS # BLD AUTO: 5.6 10E9/L (ref 1.6–8.3)
NEUTROPHILS NFR BLD AUTO: 67 %
NRBC # BLD AUTO: 0 10*3/UL
NRBC BLD AUTO-RTO: 0 /100
PLATELET # BLD AUTO: 238 10E9/L (ref 150–450)
POTASSIUM SERPL-SCNC: 3.6 MMOL/L (ref 3.4–5.3)
PROT SERPL-MCNC: 6.7 G/DL (ref 6.8–8.8)
RBC # BLD AUTO: 3.51 10E12/L (ref 4.4–5.9)
SARS-COV-2 RNA RESP QL NAA+PROBE: NEGATIVE
SODIUM SERPL-SCNC: 139 MMOL/L (ref 133–144)
SPECIMEN SOURCE: NORMAL
TSH SERPL DL<=0.005 MIU/L-ACNC: 2.04 MU/L (ref 0.4–4)
WBC # BLD AUTO: 8.4 10E9/L (ref 4–11)

## 2021-01-17 PROCEDURE — 85025 COMPLETE CBC W/AUTO DIFF WBC: CPT | Performed by: FAMILY MEDICINE

## 2021-01-17 PROCEDURE — C9803 HOPD COVID-19 SPEC COLLECT: HCPCS | Performed by: FAMILY MEDICINE

## 2021-01-17 PROCEDURE — 87635 SARS-COV-2 COVID-19 AMP PRB: CPT | Performed by: FAMILY MEDICINE

## 2021-01-17 PROCEDURE — 99285 EMERGENCY DEPT VISIT HI MDM: CPT | Mod: 25 | Performed by: FAMILY MEDICINE

## 2021-01-17 PROCEDURE — 72100 X-RAY EXAM L-S SPINE 2/3 VWS: CPT

## 2021-01-17 PROCEDURE — 99285 EMERGENCY DEPT VISIT HI MDM: CPT | Performed by: FAMILY MEDICINE

## 2021-01-17 PROCEDURE — 73502 X-RAY EXAM HIP UNI 2-3 VIEWS: CPT

## 2021-01-17 PROCEDURE — 84443 ASSAY THYROID STIM HORMONE: CPT | Performed by: FAMILY MEDICINE

## 2021-01-17 PROCEDURE — 250N000013 HC RX MED GY IP 250 OP 250 PS 637: Performed by: FAMILY MEDICINE

## 2021-01-17 PROCEDURE — 70450 CT HEAD/BRAIN W/O DYE: CPT

## 2021-01-17 PROCEDURE — 80053 COMPREHEN METABOLIC PANEL: CPT | Performed by: FAMILY MEDICINE

## 2021-01-17 RX ORDER — ACETAMINOPHEN 325 MG/1
650 TABLET ORAL ONCE
Status: COMPLETED | OUTPATIENT
Start: 2021-01-17 | End: 2021-01-17

## 2021-01-17 RX ADMIN — ACETAMINOPHEN 650 MG: 325 TABLET, FILM COATED ORAL at 07:57

## 2021-01-17 NOTE — TELEPHONE ENCOUNTER
Pharmacist Keira calling from [facility] needing to know which medications were given to patient in the ED as patient was transferred from [facility] Dept to the [facility] today. Pharmacist [facility] unable to [facility]. Pharmacist stated the only thing she is able to view regarding medications given is from the paper work that patient gave her which pharmacist stated it looks liked patient only received Tylenol 650 mg in the ED. RN stated per the ED note, Tylenol is the only medication that was given early this morning.     Medications   acetaminophen (TYLENOL) tablet 650 mg (650 mg Oral Given 1/17/21 0757)          This is all pharmacist needed to know and verify if there were any other medications given. Pharmacist stated that she has everything she needs at this time. RN advised if pharmacist has any other questions or concerns to call back nurse line. Pharmacist verbalized understanding and agrees with plan.     Pooja Teague RN, BSN Nurse Triage Advisor 12:12 PM 1/17/2021     Additional Information    General information question, no triage required and triager able to answer question    Health Information question, no triage required and triager able to answer question    Protocols used: INFORMATION ONLY CALL-A-
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
## 2021-01-17 NOTE — ED TRIAGE NOTES
Doing laundry this morning when his legs gave out and he fell. He sat down and his head hit the carpet. Denies LOC or headache.

## 2022-04-11 NOTE — ANESTHESIA POSTPROCEDURE EVALUATION
Patient: Uriah HOANG Lizzieyonatan    Procedure(s):  cataract removal and placement of new lens    Diagnosis:Nuclear sclerotic cataract of right eye [H25.11]  Diagnosis Additional Information: No value filed.    Anesthesia Type:  MAC    Note:  Anesthesia Post Evaluation    Patient location during evaluation: Phase 2  Patient participation: Able to fully participate in evaluation  Level of consciousness: awake and alert  Pain management: adequate  Airway patency: patent  Cardiovascular status: acceptable and stable  Respiratory status: acceptable and room air  Hydration status: acceptable  PONV: none     Anesthetic complications: None    Comments:  Patient was happy with the anesthesia care received and no anesthesia related complications were noted.  I will follow up with the patient again if it is needed.        Last vitals:  Vitals:    08/06/20 0724   BP: (!) 155/82   Resp: 16   Temp: 97.9  F (36.6  C)   SpO2: 94%         Electronically Signed By: LUZ Larsen CRNA  August 6, 2020  9:39 AM   [Outpatient] : Outpatient [Wheelchair] : wheelchair [THIS CHAMBER HAS BEEN CLEANED / DISINFECTED] : This chamber has been cleaned / disinfected according to local and hospital policy and procedure prior to this treatment. [____] : Post-Dive: Time - [unfilled] [___] : Post-Dive: Value - [unfilled] mg/dL [Patient demonstrated and verbalized proper technique for using air break mask] : Patient demonstrated and verbalized proper technique for using air break mask [Patient educated on the risks of SMOKING prior to HBOT with understanding] : Patient educated on the risks of SMOKING prior to HBOT with understanding [Patient educated on the risks of CONSUMING ALCOHOL prior to HBOT with understanding] : Patient educated on the risks of CONSUMING ALCOHOL prior to HBOT with understanding [100% Cotton] : 100% cotton [Empty all pockets] : empty all pockets [No hair oils, wigs, hairpieces, pins] : no hair oils, wigs, hairpieces, pins  [Pre tx medications] : pre tx medications  [No make-up, creams] : no make-up, creams  [No jewelry] : no jewelry  [No matches, cigarettes, lighters] : no matches, cigarettes, lighters  [Hearing aid removed] : hearing aid removed [Dentures removed] : dentures removed [Ground bracelet on pt's wrist] : ground bracelet on pt's wrist  [Contacts removed] : contacts removed  [Remove nail polish] : remove nail polish  [No reading material] : no reading material  [Bra, undergarments removed] : bra, undergarments removed  [No contraindicated dressings] : no contraindicated dressings [Ground Wire - VISUAL Verification - Intact/Free of Obstruction] : Ground Wire - VISUAL Verification - Intact/Free of Obstruction  [Ground Continuity - Verified < 1 ohm w/ Wrist Strap Meliza] : Ground Continuity - Verified < 1 ohm w/ Wrist Strap Meliza [Diagnosis: ___] : Diagnosis: [unfilled] [Number: ___] : Number: [unfilled] [Clear all fields] : clear all fields [I have ordered 1 HBOT session at the indicated protocol as seen below] : I have ordered 1 HBOT session at the indicated protocol as seen below [] : No [FreeTextEntry1] : 2.0 [FreeTextEntry3] : 90 Mins [FreeTextEntry5] : 0571 [facility] : 7770 [FreeTextEntry9] : 9532 [de-identified] : 8410 [de-identified] : 110 Mins/ 4 units

## (undated) DEVICE — SOL NACL 0.9% IRRIG 1000ML BOTTLE 07138-09

## (undated) DEVICE — GLOVE PROTEGRITY 7.5 LATEX

## (undated) DEVICE — SOL WATER IRRIG 1000ML BOTTLE 07139-09

## (undated) DEVICE — EYE RING MALYUGIN PUPIL EXPANDER 6.25MM MAL-000-1

## (undated) DEVICE — TAPE TRANSPORE 1/2"

## (undated) DEVICE — NDL ECLIPSE 18GA 1.5"

## (undated) RX ORDER — FENTANYL CITRATE 50 UG/ML
INJECTION, SOLUTION INTRAMUSCULAR; INTRAVENOUS
Status: DISPENSED
Start: 2020-08-06

## (undated) RX ORDER — LIDOCAINE HYDROCHLORIDE 10 MG/ML
INJECTION, SOLUTION EPIDURAL; INFILTRATION; INTRACAUDAL; PERINEURAL
Status: DISPENSED
Start: 2020-08-06

## (undated) RX ORDER — TIMOLOL MALEATE 5 MG/ML
SOLUTION/ DROPS OPHTHALMIC
Status: DISPENSED
Start: 2020-08-06